# Patient Record
Sex: FEMALE | Race: OTHER | HISPANIC OR LATINO | Employment: FULL TIME | URBAN - METROPOLITAN AREA
[De-identification: names, ages, dates, MRNs, and addresses within clinical notes are randomized per-mention and may not be internally consistent; named-entity substitution may affect disease eponyms.]

---

## 2020-04-30 ENCOUNTER — ANESTHESIA (OUTPATIENT)
Dept: EMERGENCY MEDICINE | Facility: HOSPITAL | Age: 55
End: 2020-04-30

## 2020-04-30 ENCOUNTER — ANESTHESIA EVENT (OUTPATIENT)
Dept: EMERGENCY MEDICINE | Facility: HOSPITAL | Age: 55
End: 2020-04-30

## 2020-04-30 ENCOUNTER — HOSPITAL ENCOUNTER (INPATIENT)
Facility: HOSPITAL | Age: 55
LOS: 4 days | Discharge: HOME | DRG: 394 | End: 2020-05-05
Attending: EMERGENCY MEDICINE | Admitting: STUDENT IN AN ORGANIZED HEALTH CARE EDUCATION/TRAINING PROGRAM

## 2020-04-30 DIAGNOSIS — K43.6 VENTRAL HERNIA WITH OBSTRUCTION AND WITHOUT GANGRENE: Primary | ICD-10-CM

## 2020-04-30 DIAGNOSIS — K56.690 OTHER PARTIAL INTESTINAL OBSTRUCTION: ICD-10-CM

## 2020-04-30 DIAGNOSIS — K56.600 PARTIAL BOWEL OBSTRUCTION: ICD-10-CM

## 2020-04-30 PROBLEM — Z79.4 TYPE 2 DIABETES MELLITUS, WITH LONG-TERM CURRENT USE OF INSULIN: Status: ACTIVE | Noted: 2020-04-30

## 2020-04-30 PROBLEM — E11.9 TYPE 2 DIABETES MELLITUS, WITH LONG-TERM CURRENT USE OF INSULIN: Status: ACTIVE | Noted: 2020-04-30

## 2020-04-30 LAB
ALBUMIN SERPL BCP-MCNC: 4.1 G/DL (ref 3.5–5.2)
ALP SERPL-CCNC: 97 U/L (ref 55–135)
ALT SERPL W/O P-5'-P-CCNC: 34 U/L (ref 10–44)
ANION GAP SERPL CALC-SCNC: 14 MMOL/L (ref 8–16)
AST SERPL-CCNC: 29 U/L (ref 10–40)
BASOPHILS # BLD AUTO: 0.02 K/UL (ref 0–0.2)
BASOPHILS NFR BLD: 0.2 % (ref 0–1.9)
BILIRUB SERPL-MCNC: 0.6 MG/DL (ref 0.1–1)
BUN SERPL-MCNC: 14 MG/DL (ref 6–20)
CALCIUM SERPL-MCNC: 10.4 MG/DL (ref 8.7–10.5)
CHLORIDE SERPL-SCNC: 98 MMOL/L (ref 95–110)
CO2 SERPL-SCNC: 27 MMOL/L (ref 23–29)
CREAT SERPL-MCNC: 0.8 MG/DL (ref 0.5–1.4)
DIFFERENTIAL METHOD: ABNORMAL
EOSINOPHIL # BLD AUTO: 0 K/UL (ref 0–0.5)
EOSINOPHIL NFR BLD: 0.3 % (ref 0–8)
ERYTHROCYTE [DISTWIDTH] IN BLOOD BY AUTOMATED COUNT: 14.8 % (ref 11.5–14.5)
EST. GFR  (AFRICAN AMERICAN): >60 ML/MIN/1.73 M^2
EST. GFR  (NON AFRICAN AMERICAN): >60 ML/MIN/1.73 M^2
GLUCOSE SERPL-MCNC: 151 MG/DL (ref 70–110)
HCT VFR BLD AUTO: 44.5 % (ref 37–48.5)
HGB BLD-MCNC: 14.2 G/DL (ref 12–16)
IMM GRANULOCYTES # BLD AUTO: 0.04 K/UL (ref 0–0.04)
IMM GRANULOCYTES NFR BLD AUTO: 0.4 % (ref 0–0.5)
LACTATE SERPL-SCNC: 1.8 MMOL/L (ref 0.5–2.2)
LIPASE SERPL-CCNC: 21 U/L (ref 4–60)
LYMPHOCYTES # BLD AUTO: 1.4 K/UL (ref 1–4.8)
LYMPHOCYTES NFR BLD: 12.9 % (ref 18–48)
MCH RBC QN AUTO: 27.6 PG (ref 27–31)
MCHC RBC AUTO-ENTMCNC: 31.9 G/DL (ref 32–36)
MCV RBC AUTO: 87 FL (ref 82–98)
MONOCYTES # BLD AUTO: 0.6 K/UL (ref 0.3–1)
MONOCYTES NFR BLD: 5.2 % (ref 4–15)
NEUTROPHILS # BLD AUTO: 8.6 K/UL (ref 1.8–7.7)
NEUTROPHILS NFR BLD: 81 % (ref 38–73)
NRBC BLD-RTO: 0 /100 WBC
PLATELET # BLD AUTO: 308 K/UL (ref 150–350)
PMV BLD AUTO: 9.4 FL (ref 9.2–12.9)
POCT GLUCOSE: 109 MG/DL (ref 70–110)
POCT GLUCOSE: 122 MG/DL (ref 70–110)
POCT GLUCOSE: 93 MG/DL (ref 70–110)
POTASSIUM SERPL-SCNC: 4.2 MMOL/L (ref 3.5–5.1)
PROT SERPL-MCNC: 8.5 G/DL (ref 6–8.4)
RBC # BLD AUTO: 5.14 M/UL (ref 4–5.4)
SARS-COV-2 RDRP RESP QL NAA+PROBE: NEGATIVE
SODIUM SERPL-SCNC: 139 MMOL/L (ref 136–145)
WBC # BLD AUTO: 10.59 K/UL (ref 3.9–12.7)

## 2020-04-30 PROCEDURE — G0378 HOSPITAL OBSERVATION PER HR: HCPCS

## 2020-04-30 PROCEDURE — 27000221 HC OXYGEN, UP TO 24 HOURS

## 2020-04-30 PROCEDURE — 99285 EMERGENCY DEPT VISIT HI MDM: CPT | Mod: 25

## 2020-04-30 PROCEDURE — 63600175 PHARM REV CODE 636 W HCPCS: Performed by: EMERGENCY MEDICINE

## 2020-04-30 PROCEDURE — 94761 N-INVAS EAR/PLS OXIMETRY MLT: CPT

## 2020-04-30 PROCEDURE — 99222 1ST HOSP IP/OBS MODERATE 55: CPT | Mod: ,,, | Performed by: STUDENT IN AN ORGANIZED HEALTH CARE EDUCATION/TRAINING PROGRAM

## 2020-04-30 PROCEDURE — U0002 COVID-19 LAB TEST NON-CDC: HCPCS

## 2020-04-30 PROCEDURE — 63600175 PHARM REV CODE 636 W HCPCS: Performed by: STUDENT IN AN ORGANIZED HEALTH CARE EDUCATION/TRAINING PROGRAM

## 2020-04-30 PROCEDURE — 80053 COMPREHEN METABOLIC PANEL: CPT

## 2020-04-30 PROCEDURE — 96375 TX/PRO/DX INJ NEW DRUG ADDON: CPT | Performed by: EMERGENCY MEDICINE

## 2020-04-30 PROCEDURE — 96361 HYDRATE IV INFUSION ADD-ON: CPT | Performed by: EMERGENCY MEDICINE

## 2020-04-30 PROCEDURE — 99900035 HC TECH TIME PER 15 MIN (STAT)

## 2020-04-30 PROCEDURE — 83605 ASSAY OF LACTIC ACID: CPT

## 2020-04-30 PROCEDURE — 25000003 PHARM REV CODE 250: Performed by: STUDENT IN AN ORGANIZED HEALTH CARE EDUCATION/TRAINING PROGRAM

## 2020-04-30 PROCEDURE — 96375 TX/PRO/DX INJ NEW DRUG ADDON: CPT

## 2020-04-30 PROCEDURE — 83690 ASSAY OF LIPASE: CPT

## 2020-04-30 PROCEDURE — 96374 THER/PROPH/DIAG INJ IV PUSH: CPT | Mod: 59

## 2020-04-30 PROCEDURE — 25500020 PHARM REV CODE 255: Performed by: EMERGENCY MEDICINE

## 2020-04-30 PROCEDURE — 85025 COMPLETE CBC W/AUTO DIFF WBC: CPT

## 2020-04-30 PROCEDURE — 99222 PR INITIAL HOSPITAL CARE,LEVL II: ICD-10-PCS | Mod: ,,, | Performed by: STUDENT IN AN ORGANIZED HEALTH CARE EDUCATION/TRAINING PROGRAM

## 2020-04-30 RX ORDER — INSULIN ASPART 100 [IU]/ML
0-5 INJECTION, SOLUTION INTRAVENOUS; SUBCUTANEOUS EVERY 6 HOURS PRN
Status: DISCONTINUED | OUTPATIENT
Start: 2020-04-30 | End: 2020-05-05 | Stop reason: HOSPADM

## 2020-04-30 RX ORDER — ACETAMINOPHEN 325 MG/1
650 TABLET ORAL EVERY 6 HOURS PRN
Status: DISCONTINUED | OUTPATIENT
Start: 2020-04-30 | End: 2020-05-05 | Stop reason: HOSPADM

## 2020-04-30 RX ORDER — ONDANSETRON 2 MG/ML
4 INJECTION INTRAMUSCULAR; INTRAVENOUS
Status: COMPLETED | OUTPATIENT
Start: 2020-04-30 | End: 2020-04-30

## 2020-04-30 RX ORDER — MORPHINE SULFATE 4 MG/ML
4 INJECTION, SOLUTION INTRAMUSCULAR; INTRAVENOUS
Status: COMPLETED | OUTPATIENT
Start: 2020-04-30 | End: 2020-04-30

## 2020-04-30 RX ORDER — DEXTROSE 50 % IN WATER (D50W) INTRAVENOUS SYRINGE
12.5
Status: DISCONTINUED | OUTPATIENT
Start: 2020-04-30 | End: 2020-05-05 | Stop reason: HOSPADM

## 2020-04-30 RX ORDER — GLUCAGON 1 MG
1 KIT INJECTION
Status: DISCONTINUED | OUTPATIENT
Start: 2020-04-30 | End: 2020-05-05 | Stop reason: HOSPADM

## 2020-04-30 RX ORDER — GLUCAGON 1 MG
1 KIT INJECTION
Status: DISCONTINUED | OUTPATIENT
Start: 2020-04-30 | End: 2020-04-30 | Stop reason: SDUPTHER

## 2020-04-30 RX ORDER — HYDROCHLOROTHIAZIDE 25 MG/1
25 TABLET ORAL DAILY
Status: DISCONTINUED | OUTPATIENT
Start: 2020-04-30 | End: 2020-05-05 | Stop reason: HOSPADM

## 2020-04-30 RX ORDER — SODIUM CHLORIDE 0.9 % (FLUSH) 0.9 %
10 SYRINGE (ML) INJECTION
Status: DISCONTINUED | OUTPATIENT
Start: 2020-04-30 | End: 2020-05-05 | Stop reason: HOSPADM

## 2020-04-30 RX ORDER — SODIUM CHLORIDE, SODIUM LACTATE, POTASSIUM CHLORIDE, CALCIUM CHLORIDE 600; 310; 30; 20 MG/100ML; MG/100ML; MG/100ML; MG/100ML
INJECTION, SOLUTION INTRAVENOUS CONTINUOUS
Status: DISCONTINUED | OUTPATIENT
Start: 2020-04-30 | End: 2020-05-02

## 2020-04-30 RX ORDER — MORPHINE SULFATE 2 MG/ML
2 INJECTION, SOLUTION INTRAMUSCULAR; INTRAVENOUS EVERY 4 HOURS PRN
Status: DISCONTINUED | OUTPATIENT
Start: 2020-04-30 | End: 2020-05-05 | Stop reason: HOSPADM

## 2020-04-30 RX ORDER — ATORVASTATIN CALCIUM 40 MG/1
40 TABLET, FILM COATED ORAL DAILY
Status: DISCONTINUED | OUTPATIENT
Start: 2020-04-30 | End: 2020-05-05 | Stop reason: HOSPADM

## 2020-04-30 RX ADMIN — BENZOCAINE: 220 SPRAY, METERED PERIODONTAL at 06:04

## 2020-04-30 RX ADMIN — ACETAMINOPHEN 650 MG: 325 TABLET ORAL at 10:04

## 2020-04-30 RX ADMIN — LORAZEPAM 1 MG: 2 INJECTION INTRAMUSCULAR; INTRAVENOUS at 06:04

## 2020-04-30 RX ADMIN — TOPICAL ANESTHETIC: 200 SPRAY DENTAL; PERIODONTAL at 06:04

## 2020-04-30 RX ADMIN — HYDROCHLOROTHIAZIDE 25 MG: 25 TABLET ORAL at 03:04

## 2020-04-30 RX ADMIN — ATORVASTATIN CALCIUM 40 MG: 40 TABLET, FILM COATED ORAL at 03:04

## 2020-04-30 RX ADMIN — MORPHINE SULFATE 4 MG: 4 INJECTION INTRAVENOUS at 03:04

## 2020-04-30 RX ADMIN — IOHEXOL 100 ML: 350 INJECTION, SOLUTION INTRAVENOUS at 04:04

## 2020-04-30 RX ADMIN — SODIUM CHLORIDE, SODIUM LACTATE, POTASSIUM CHLORIDE, AND CALCIUM CHLORIDE: .6; .31; .03; .02 INJECTION, SOLUTION INTRAVENOUS at 12:04

## 2020-04-30 RX ADMIN — ONDANSETRON 4 MG: 2 INJECTION INTRAMUSCULAR; INTRAVENOUS at 03:04

## 2020-04-30 NOTE — ASSESSMENT & PLAN NOTE
Reina Edwards is a 54 y.o. female w/ pSBO from incarcerated ventral hernia    -Admit to obs  -NPO, NG to LIWS  -mIVF  -Send COVID test  -Will discuss surgey with staff and update note

## 2020-04-30 NOTE — ED NOTES
Report received form JUSTO Roa and JUSTO Barney. Assumed care of pt. Pt resting in recliner, in no acute distress. Pt aware of plan of care. Will continue to closely monitor.

## 2020-04-30 NOTE — ED TRIAGE NOTES
per Catracho Medina #[id number]. Pt states she has pain in the top part of  her abdomen that is related to hernia. Pain 8/10 at this time. Pt reports she vomited twice before coming to the ER. Pt states she was supposed to have surgery but it was cancelled because of the corona virus. Pt states she has been having vaginal spotting for over a year and she does not get periods. Will cont to monitor.

## 2020-04-30 NOTE — CONSULTS
[facility]  General Surgery  Consult Note    Patient Name: Reina Edwards  MRN: [id number]  Code Status: No Order  Admission Date: 4/30/2020  Hospital Length of Stay: 0 days  Attending Physician: No att. providers found  Primary Care Provider: No primary care provider on file.    Patient information was obtained from patient, past medical records and ER records.     Consults  Subjective:     Principal Problem: <principal problem not specified>    History of Present Illness: Reina Edwards is a 54 y.o. female w hx of morbid obesity (BMI 57), HTN, HLD, and recent hx of MI who presents with vomiting and abdominal pain.  States that she has had a midline hernia for about a year.  She says that she used to be able to reduce this, but recently can only partially reduce.  Of note, she says that she had an MI two months ago at OSH that was treated only with medication.  She is on ASA and Plavix (but says she hasn't taken plavix in over a week since she ran out).    PSHx- hysterectomy through [facility], [facility]  PMHx- as above        No current facility-administered medications on file prior to encounter.      Current Outpatient Medications on File Prior to Encounter   Medication Sig    atorvastatin (LIPITOR) 40 MG tablet Take 1 tablet (40 mg total) by mouth once daily.    hydrochlorothiazide (HYDRODIURIL) 25 MG tablet Take 1 tablet (25 mg total) by mouth once daily.    lisinopril (PRINIVIL,ZESTRIL) 40 MG tablet Take 1 tablet (40 mg total) by mouth once daily.    metformin (GLUCOPHAGE) 500 MG tablet Take 1 tablet (500 mg total) by mouth daily with breakfast.    nitroGLYCERIN (NITROSTAT) 0.4 MG SL tablet Place 1 tablet (0.4 mg total) under the tongue every 5 (five) minutes as needed for Chest pain (Take 1 after 15 minutes of chest pain, if chest pain persists, take a second pill and head to local Emergency Room).    sodium chloride (OCEAN) 0.65 % nasal spray 1 spray by Nasal route as needed for  Congestion.       Review of patient's allergies indicates:  No Known Allergies    Past Medical History:   Diagnosis Date    Hypertension      Past Surgical History:   Procedure Laterality Date     SECTION      CHOLECYSTECTOMY       Family History     None        Tobacco Use    Smoking status: Never Smoker    Smokeless tobacco: Never Used   Substance and Sexual Activity    Alcohol use: No    Drug use: No    Sexual activity: Not on file     Review of Systems   Constitutional: Positive for activity change and appetite change. Negative for chills, fatigue and fever.   HENT: Negative for sinus pressure and sinus pain.    Eyes: Negative for discharge and itching.   Respiratory: Negative for cough, choking and shortness of breath.    Cardiovascular: Negative for chest pain and leg swelling.   Gastrointestinal: Positive for abdominal distention, abdominal pain, nausea and vomiting. Negative for anal bleeding, blood in stool, constipation and diarrhea.   Endocrine: Negative for cold intolerance and heat intolerance.   Genitourinary: Negative for difficulty urinating and dysuria.   Musculoskeletal: Negative for arthralgias and back pain.   Skin: Negative for color change, pallor and rash.   Neurological: Negative for dizziness and facial asymmetry.   Psychiatric/Behavioral: Negative for agitation.     Objective:     Vital Signs (Most Recent):  Temp: 98.2 °F (36.8 °C) (20)  Pulse: 98 (20)  Resp: 18 (20)  BP: (!) 155/82 (20)  SpO2: 95 % (2042) Vital Signs (24h Range):  Temp:  [98.2 °F (36.8 °C)-98.5 °F (36.9 °C)] 98.2 °F (36.8 °C)  Pulse:  [75-98] 98  Resp:  [16-18] 18  SpO2:  [80 %-99 %] 95 %  BP: (155-182)/(80-94) 155/82     Weight: 133.4 kg (294 lb)  Body mass index is 57.42 kg/m².    Physical Exam   Constitutional: She is oriented to person, place, and time. She appears well-developed and well-nourished.   HENT:   Head: Normocephalic and atraumatic.    Eyes: Right eye exhibits no discharge. Left eye exhibits no discharge.   Neck: Normal range of motion. Neck supple.   Cardiovascular: Normal rate and regular rhythm.   Pulmonary/Chest: Effort normal. No respiratory distress.   Satting 97% on 6 L NC   Abdominal: Soft.   Somewhat distended.  Obese.  Can appreciate a midline hernia above umbilicus but can't define fascial edges.  Can not reduce.  No overlying skin changes.  Scars indicative of surgeries mentioned in HPI   Musculoskeletal: Normal range of motion.   Neurological: She is alert and oriented to person, place, and time.   Skin: Skin is warm and dry.   Psychiatric: She has a normal mood and affect. Her behavior is normal.   Vitals reviewed.      Significant Labs:  CBC:   Recent Labs   Lab 04/30/20 0313   WBC 10.59   RBC 5.14   HGB 14.2   HCT 44.5      MCV 87   MCH 27.6   MCHC 31.9*     CMP:   Recent Labs   Lab 04/30/20 0313   *   CALCIUM 10.4   ALBUMIN 4.1   PROT 8.5*      K 4.2   CO2 27   CL 98   BUN 14   CREATININE 0.8   ALKPHOS 97   ALT 34   AST 29   BILITOT 0.6       Significant Diagnostics:  I have reviewed all pertinent imaging results/findings within the past 24 hours.    Assessment/Plan:     Partial bowel obstruction  Reina Edwards is a 54 y.o. female w/ pSBO from incarcerated ventral hernia    -Admit to obs  -NPO, NG to LIWS  -mIVF  -Will decompress tonight and likely get a GG study tomorrow  -If she does not open up on her own, then likely will need a hernia repair  -Explained to patient that she would be high risk given cardiac hx and other comorbidities  -Will hold off on anticoagulation right now in case she needs surgery in the next 24 hours      VTE Risk Mitigation (From admission, onward)    None          Thank you for your consult. I will follow-up with patient. Please contact us if you have any additional questions.    Sloan Kelley MD  General Surgery  [facility]

## 2020-04-30 NOTE — ED PROVIDER NOTES
Encounter Date: 2020    SCRIBE #1 NOTE: I, Tenzin Jose, am scribing for, and in the presence of,  Dr. Marcano. I have scribed the entire note.     I, Dr. Bella Marcano MD, personally performed the services described in this documentation. All medical record entries made by the scribe were at my direction and in my presence.  I have reviewed the chart and agree that the record reflects my personal performance and is accurate and complete. Bella Marcano MD.    History     Chief Complaint   Patient presents with    Abdominal Pain     To ER with c/o pain to abd hernia and vomiting starting yesterday    Vomiting     CHIEF COMPLAINT: Patient presents with: abdominal pain     HISTORY OF PRESENT ILLNESS: Reina Edwards who is a 54 y.o. presents to the emergency department today with complaint of abdominal pain related to hernia with vomiting. The patient reports she has had a ventral hernia for the past year for which she has been evaluated at [facility], but reports she began having pain to the upper abdomen at the hernia site yesterday. She states the pain has been constant since onset has had vomiting since yesterday. She had 2 episodes of vomiting. No hematemesis, no coffee ground emesis.Pain is rated 8/10 in the ED, and notes her last bowel movement occurred 4 hours ago. Patient denies any fever, diarrhea, or change in urination. She has a history of DM, and states that she takes pills only.    ALLERGIES REVIEWED  MEDICATIONS REVIEWED  PMH/PSH/SOC/FH REVIEWED     The history is provided by the patient.    Nursing/Ancillary staff note reviewed.    The history is provided by the patient. The history is limited by a language barrier. A  was used.     Review of patient's allergies indicates:  No Known Allergies  Past Medical History:   Diagnosis Date    Hypertension      Past Surgical History:   Procedure Laterality Date     SECTION      CHOLECYSTECTOMY       No family history on file.  Social  History     Tobacco Use    Smoking status: Never Smoker    Smokeless tobacco: Never Used   Substance Use Topics    Alcohol use: No    Drug use: No     Review of Systems   Constitutional: Negative for activity change, appetite change, chills, diaphoresis and fever.   HENT: Negative for congestion, drooling, ear pain, mouth sores, rhinorrhea, sinus pain, sore throat and trouble swallowing.    Eyes: Negative for pain and discharge.   Respiratory: Negative for cough, chest tightness, shortness of breath, wheezing and stridor.    Cardiovascular: Negative for chest pain, palpitations and leg swelling.   Gastrointestinal: Positive for abdominal pain, nausea and vomiting. Negative for abdominal distention, blood in stool, constipation and diarrhea.   Genitourinary: Negative for difficulty urinating, dysuria, flank pain, frequency, hematuria and urgency.   Musculoskeletal: Negative for arthralgias, back pain and myalgias.   Skin: Negative for pallor, rash and wound.   Neurological: Negative for dizziness, syncope, weakness, light-headedness and numbness.   All other systems reviewed and are negative.      Physical Exam     Initial Vitals [04/30/20 0211]   BP Pulse Resp Temp SpO2   (!) 160/80 93 18 98.5 °F (36.9 °C) 95 %      MAP       --         Physical Exam    Nursing note and vitals reviewed.  Constitutional: She appears well-developed and well-nourished.   Obese   HENT:   Head: Normocephalic and atraumatic.   Right Ear: External ear normal.   Left Ear: External ear normal.   Nose: Nose normal.   Mouth/Throat: Oropharynx is clear and moist.   Eyes: Conjunctivae and EOM are normal. Pupils are equal, round, and reactive to light. No scleral icterus.   Neck: Normal range of motion. Neck supple. No JVD present.   Cardiovascular: Normal rate, regular rhythm, normal heart sounds and intact distal pulses. Exam reveals no gallop and no friction rub.    No murmur heard.  Pulmonary/Chest: Breath sounds normal. No stridor. No  respiratory distress. She has no wheezes. She exhibits no tenderness.   Abdominal: Soft. Bowel sounds are normal. She exhibits no distension and no mass. There is tenderness. There is no rebound and no guarding.   Slight tenderness to palpation to the epigastric region and above the umbilicus. I can palpate her ventral hernia.    Musculoskeletal: Normal range of motion. She exhibits no edema or tenderness.   Back is nontender to palpation.    Neurological: She is alert and oriented to person, place, and time. She has normal strength. No cranial nerve deficit.   Skin: Skin is warm and dry. Capillary refill takes less than 2 seconds. No rash noted. No pallor.   Psychiatric: She has a normal mood and affect. Thought content normal.         ED Course   Procedures  Labs Reviewed   CBC W/ AUTO DIFFERENTIAL - Abnormal; Notable for the following components:       Result Value    Mean Corpuscular Hemoglobin Conc 31.9 (*)     RDW 14.8 (*)     Gran # (ANC) 8.6 (*)     Gran% 81.0 (*)     Lymph% 12.9 (*)     All other components within normal limits   COMPREHENSIVE METABOLIC PANEL - Abnormal; Notable for the following components:    Glucose 151 (*)     Total Protein 8.5 (*)     All other components within normal limits   LIPASE          Imaging Results          CT Abdomen Pelvis With Contrast (Final result)  Result time 04/30/20 05:02:07    Final result by Lourdes Robb MD (04/30/20 05:02:07)                 Impression:      1. Prominent and mildly dilated loops of fluid-filled proximal and mid small bowel concerning for possible developing/partial small bowel obstruction with transition point involving a ventral abdominal wall hernia as detailed above.  2. Hypodensity involving endometrial canal possibly relating to endometrial fluid or hyperplasia.  Further evaluation with pelvic ultrasound advised.  Uterine fibroid.  3. Hepatomegaly and findings suggestive of hepatic steatosis.  Correlation with LFTs advised.  4. Fluid  within the distal esophagus which can be seen with dysmotility/reflux.  5. Slight basilar ground-glass opacity which can be seen with atelectasis possibly edema versus infectious or non infectious inflammatory process.  6. Cardiomegaly  7. Additional findings as discussed above.      Electronically signed by: Lourdes Robb MD  Date:    04/30/2020  Time:    05:02             Narrative:    EXAMINATION:  CT ABDOMEN PELVIS WITH CONTRAST    CLINICAL HISTORY:  Hernia, complicated;    TECHNIQUE:  Low dose axial images, sagittal and coronal reformations were obtained from the lung bases to the pubic symphysis following the IV administration of 100 mL of Omnipaque 350 .  Oral contrast was not given.    COMPARISON:  None.    FINDINGS:  The visualized lung bases demonstrate slight basilar ground-glass opacity which could relate to underlying atelectasis versus edema or non infectious or infectious inflammatory process.  Clinical correlation advised.  The heart appears prominent in size and there is calcification of the mitral annulus.    Please note examination is limited due to artifact from patient body habitus as well as patient motion artifact.  The liver is prominent in size and hypoattenuating which can be seen in the setting of hepatic steatosis.  Correlation with LFTs advised.  The gallbladder is surgically absent.  No significant intra or extrahepatic biliary ductal dilatation.  The spleen, pancreas, and adrenal glands are unremarkable.  There is fluid within the distal esophagus which can be seen in the setting of reflux/dysmotility.    The kidneys are normal in size and location enhance symmetrically.  No evidence of hydronephrosis.  There is a subcentimeter left renal hypodensity too small to definitively characterize.  The urinary bladder is within normal limits.  Uterus demonstrates a hypoattenuating 2.9 x 3.1 cm lesion in the uterine parenchyma likely a fibroid.  There is hypodensity within the endometrial canal.   Possible Bartholin's gland cyst present.  The adnexal regions are within normal limits.    There are prominent and mildly dilated loops of proximal and mid small bowel containing fluid and measuring up to 3.3 cm.  These prominent loops of bowel extend into a ventral abdominal wall hernia noting loops of small bowel in the hernia sac demonstrate and associated small bowel feces sign.  Decompressed loops of small bowel exit the hernia with decompressed distal loops of small bowel within the remaining abdomen.  Findings are concerning for possible developing small bowel obstruction.  No evidence of free intraperitoneal air or portal venous gas.  No ascites.  There is diverticulosis without evidence to suggest diverticulitis.    The abdominal aorta is nonaneurysmal with mild atherosclerosis. No bulky lymphadenopathy.  Visualized osseous structures demonstrate degenerative change.                                 Medical Decision Making:   History:   Old Medical Records: I decided to obtain old medical records.  Old Records Summarized: other records.       <> Summary of Records: Known h/o ventral hernia.   Initial Assessment:   54-year-old female presents to the emergency department today with complaint of abdominal pain from her ventral hernia.  Concerns are for an obstructing hernia/incarcerated hernia, small bowel obstruction.  Will obtain labs including CBC, CMP, lipase, obtain CT scan to further evaluate her hernia.  Treat her pain and reassess.  Differential Diagnosis:   Gastroenteritis, gastritis, ulcer, cholecystitis, gallstones, pancreatitis, ileus, small bowel obstruction, appendicitis, constipation.     Clinical Tests:   Lab Tests: Ordered and Reviewed  Radiological Study: Ordered and Reviewed  ED Management:  Reina Edwards presents to the ED with abdominal pain, she has signs of patial vs developing bowel obstruction on her CT scan. I spoke with Dr Barboza who agrees with admission. She'll be admitted to   [facility].                    ED Course as of Apr 30 0543   Thu Apr 30, 2020   0350 Normal labs :    [JA]   0350 WBC: 10.59 [JA]   0350 Hemoglobin: 14.2 [JA]   0350 Hematocrit: 44.5 [JA]   0350 BILIRUBIN TOTAL: 0.6 [JA]   0350 AST: 29 [JA]   0350 ALT: 34 [JA]   0350 Lipase: 21 [JA]   0513 Patient's pain has resolved; no further vomiting. CT scan shows possible developing bowel obstruction. Will call General Surgery for their recommendations.    [EW]   5522 Discussed with Dr. Barboza, General Surgery, who will admit the patient.    [EW]      ED Course User Index  [EW] Tenzin Jose  [JA] Bella Marcano MD                Clinical Impression:       ICD-10-CM ICD-9-CM   1. Ventral hernia with obstruction and without gangrene K43.6 552.20   2. Partial bowel obstruction K56.600 560.9         Disposition:   Disposition: Admitted  Condition: Fair     ED Disposition Condition    Observation                           Bella Marcano MD  04/30/20 0512

## 2020-04-30 NOTE — ANESTHESIA PREPROCEDURE EVALUATION
04/30/2020  Reina Edwards is a 54 y.o., female w pmhx of HTN/HLD, CAD (on ASA & plavix), DM2, morbid obesity (BMI 57) with abd pain with ventral hernia with signs of SBO    Pre-op Assessment    I have reviewed the Patient Summary Reports.     I have reviewed the Nursing Notes.      Review of Systems  Cardiovascular:   Hypertension CAD      Pulmonary:  Pulmonary Normal    Hepatic/GI:   Ventral hernia with SBO   Neurological:  Neurology Normal    Endocrine:   Diabetes        Physical Exam  General:  Morbid Obesity                 Anesthesia Plan  Type of Anesthesia, risks & benefits discussed:  Anesthesia Type:  general, regional  Patient's Preference:   Intra-op Monitoring Plan: standard ASA monitors  Intra-op Monitoring Plan Comments:   Post Op Pain Control Plan:   Post Op Pain Control Plan Comments:   Induction:    Beta Blocker:         Informed Consent:    ASA Score:      Day of Surgery Review of History & Physical:              Lab Results   Component Value Date    WBC 10.59 04/30/2020    HGB 14.2 04/30/2020    HCT 44.5 04/30/2020     04/30/2020    ALT 34 04/30/2020    AST 29 04/30/2020     04/30/2020    K 4.2 04/30/2020    CL 98 04/30/2020    CREATININE 0.8 04/30/2020    BUN 14 04/30/2020    CO2 27 04/30/2020    INR 1.0 02/26/2016

## 2020-04-30 NOTE — SUBJECTIVE & OBJECTIVE
No current facility-administered medications on file prior to encounter.      Current Outpatient Medications on File Prior to Encounter   Medication Sig    atorvastatin (LIPITOR) 40 MG tablet Take 1 tablet (40 mg total) by mouth once daily.    hydrochlorothiazide (HYDRODIURIL) 25 MG tablet Take 1 tablet (25 mg total) by mouth once daily.    lisinopril (PRINIVIL,ZESTRIL) 40 MG tablet Take 1 tablet (40 mg total) by mouth once daily.    metformin (GLUCOPHAGE) 500 MG tablet Take 1 tablet (500 mg total) by mouth daily with breakfast.    nitroGLYCERIN (NITROSTAT) 0.4 MG SL tablet Place 1 tablet (0.4 mg total) under the tongue every 5 (five) minutes as needed for Chest pain (Take 1 after 15 minutes of chest pain, if chest pain persists, take a second pill and head to local Emergency Room).    sodium chloride (OCEAN) 0.65 % nasal spray 1 spray by Nasal route as needed for Congestion.       Review of patient's allergies indicates:  No Known Allergies    Past Medical History:   Diagnosis Date    Hypertension      Past Surgical History:   Procedure Laterality Date     SECTION      CHOLECYSTECTOMY       Family History     None        Tobacco Use    Smoking status: Never Smoker    Smokeless tobacco: Never Used   Substance and Sexual Activity    Alcohol use: No    Drug use: No    Sexual activity: Not on file     Review of Systems   Constitutional: Positive for activity change and appetite change. Negative for chills, fatigue and fever.   HENT: Negative for sinus pressure and sinus pain.    Eyes: Negative for discharge and itching.   Respiratory: Negative for cough, choking and shortness of breath.    Cardiovascular: Negative for chest pain and leg swelling.   Gastrointestinal: Positive for abdominal distention, abdominal pain, nausea and vomiting. Negative for anal bleeding, blood in stool, constipation and diarrhea.   Endocrine: Negative for cold intolerance and heat intolerance.   Genitourinary: Negative  for difficulty urinating and dysuria.   Musculoskeletal: Negative for arthralgias and back pain.   Skin: Negative for color change, pallor and rash.   Neurological: Negative for dizziness and facial asymmetry.   Psychiatric/Behavioral: Negative for agitation.     Objective:     Vital Signs (Most Recent):  Temp: 98.2 °F (36.8 °C) (04/30/20 0727)  Pulse: 98 (04/30/20 0742)  Resp: 18 (04/30/20 0727)  BP: (!) 155/82 (04/30/20 0727)  SpO2: 95 % (04/30/20 0742) Vital Signs (24h Range):  Temp:  [98.2 °F (36.8 °C)-98.5 °F (36.9 °C)] 98.2 °F (36.8 °C)  Pulse:  [75-98] 98  Resp:  [16-18] 18  SpO2:  [80 %-99 %] 95 %  BP: (155-182)/(80-94) 155/82     Weight: 133.4 kg (294 lb)  Body mass index is 57.42 kg/m².    Physical Exam   Constitutional: She is oriented to person, place, and time. She appears well-developed and well-nourished.   HENT:   Head: Normocephalic and atraumatic.   Eyes: Right eye exhibits no discharge. Left eye exhibits no discharge.   Neck: Normal range of motion. Neck supple.   Cardiovascular: Normal rate and regular rhythm.   Pulmonary/Chest: Effort normal. No respiratory distress.   Satting 97% on 6 L NC   Abdominal: Soft.   Somewhat distended.  Obese.  Can appreciate a midline hernia above umbilicus but can't define fascial edges.  Can not reduce.  No overlying skin changes.  Scars indicative of surgeries mentioned in HPI   Musculoskeletal: Normal range of motion.   Neurological: She is alert and oriented to person, place, and time.   Skin: Skin is warm and dry.   Psychiatric: She has a normal mood and affect. Her behavior is normal.   Vitals reviewed.      Significant Labs:  CBC:   Recent Labs   Lab 04/30/20 0313   WBC 10.59   RBC 5.14   HGB 14.2   HCT 44.5      MCV 87   MCH 27.6   MCHC 31.9*     CMP:   Recent Labs   Lab 04/30/20 0313   *   CALCIUM 10.4   ALBUMIN 4.1   PROT 8.5*      K 4.2   CO2 27   CL 98   BUN 14   CREATININE 0.8   ALKPHOS 97   ALT 34   AST 29   BILITOT 0.6        Significant Diagnostics:  I have reviewed all pertinent imaging results/findings within the past 24 hours.

## 2020-04-30 NOTE — PLAN OF CARE
Patient alert and awake. With intact NGT draining to thick yellowish secretions. On oxygen at 3L/NC. MD with orders to give ice chips.On Meds given. Oriented to room set up. Call light within reach. Bed alarm on.

## 2020-04-30 NOTE — ED NOTES
Pt unable to tolerate NGT insertion. As soon as tube entered nare, patient began batting nurses hands away and yelling no. Pt unable to tolerate and appears very agitated. Dr. Hartman notified and arrived to bedside and encouraged pt to allow nurses to retry after receiving IV Antivan.  Cynthia [phone] from [employer] remained on Ipad monitor for entire conversation.

## 2020-04-30 NOTE — HPI
Reina Edwards is a 54 y.o. female w hx of morbid obesity (BMI 57), HTN, HLD, and recent hx of MI who presents with vomiting and abdominal pain.  States that she has had a midline hernia for about a year.  She says that she used to be able to reduce this, but recently can only partially reduce.  Of note, she says that she had an MI two months ago at OSH that was treated only with medication.  She is on ASA and Plavix (but says she hasn't taken plavix in over a week since she ran out).    PSHx- hysterectomy through [facility], lap [facility]  PMHx- as above

## 2020-04-30 NOTE — PLAN OF CARE
BEBE note:   Pt's chart, labs and vital signs reviewed.  Pt Albanian speaking only, informed bedside nurse BEBE Parks unable to complete admission due to language barrier.  Will be available to intervene if needed.

## 2020-04-30 NOTE — ED NOTES
Gastric pH=7 after NGT placement. Dr. Hartman notified. Awaiting cxray to confirm placement and determine if tube adjustment is required.

## 2020-05-01 LAB
POCT GLUCOSE: 101 MG/DL (ref 70–110)
POCT GLUCOSE: 104 MG/DL (ref 70–110)
POCT GLUCOSE: 109 MG/DL (ref 70–110)
POCT GLUCOSE: 126 MG/DL (ref 70–110)
POCT GLUCOSE: 131 MG/DL (ref 70–110)

## 2020-05-01 PROCEDURE — 11000001 HC ACUTE MED/SURG PRIVATE ROOM

## 2020-05-01 PROCEDURE — 27000221 HC OXYGEN, UP TO 24 HOURS

## 2020-05-01 PROCEDURE — 25000003 PHARM REV CODE 250: Performed by: STUDENT IN AN ORGANIZED HEALTH CARE EDUCATION/TRAINING PROGRAM

## 2020-05-01 PROCEDURE — 94761 N-INVAS EAR/PLS OXIMETRY MLT: CPT

## 2020-05-01 PROCEDURE — 96376 TX/PRO/DX INJ SAME DRUG ADON: CPT | Performed by: EMERGENCY MEDICINE

## 2020-05-01 PROCEDURE — 63600175 PHARM REV CODE 636 W HCPCS: Performed by: STUDENT IN AN ORGANIZED HEALTH CARE EDUCATION/TRAINING PROGRAM

## 2020-05-01 PROCEDURE — 96361 HYDRATE IV INFUSION ADD-ON: CPT | Performed by: EMERGENCY MEDICINE

## 2020-05-01 RX ORDER — ONDANSETRON 2 MG/ML
8 INJECTION INTRAMUSCULAR; INTRAVENOUS EVERY 8 HOURS PRN
Status: DISCONTINUED | OUTPATIENT
Start: 2020-05-01 | End: 2020-05-05 | Stop reason: HOSPADM

## 2020-05-01 RX ADMIN — MORPHINE SULFATE 2 MG: 2 INJECTION, SOLUTION INTRAMUSCULAR; INTRAVENOUS at 07:05

## 2020-05-01 RX ADMIN — HYDROCHLOROTHIAZIDE 25 MG: 25 TABLET ORAL at 09:05

## 2020-05-01 RX ADMIN — ACETAMINOPHEN 650 MG: 325 TABLET ORAL at 05:05

## 2020-05-01 RX ADMIN — ACETAMINOPHEN 650 MG: 325 TABLET ORAL at 10:05

## 2020-05-01 RX ADMIN — SODIUM CHLORIDE, SODIUM LACTATE, POTASSIUM CHLORIDE, AND CALCIUM CHLORIDE: .6; .31; .03; .02 INJECTION, SOLUTION INTRAVENOUS at 09:05

## 2020-05-01 RX ADMIN — ATORVASTATIN CALCIUM 40 MG: 40 TABLET, FILM COATED ORAL at 09:05

## 2020-05-01 NOTE — PLAN OF CARE
TN met with patient at bedside to complete discharge assessment. Language line also used to complete assessment. Currently, patient lives at home with her daughter Carol [phone]. No DME or HH noted. Upon discharge, patient states that her daughter will provide transportation home and be available to help as needed. Patient states that she goes to [facility] for her PCP.      TN case manger updated whiteboard with contact information. TN instructed patient to contact TN if she has any further questions or concerns. TN will follow throughout transitions of care and will assist with any discharge needs.          05/01/20 1404   Discharge Assessment   Assessment Type Discharge Planning Assessment   Confirmed/corrected address and phone number on facesheet? Yes   Assessment information obtained from? Patient;Medical Record   Expected Length of Stay (days) 2   Communicated expected length of stay with patient/caregiver yes   Prior to hospitilization cognitive status: Alert/Oriented   Prior to hospitalization functional status: Independent   Current cognitive status: Alert/Oriented   Current Functional Status: Needs Assistance   Lives With child(minal), adult  (Carol [phone])   Able to Return to Prior Arrangements yes   Is patient able to care for self after discharge? Yes   Patient's perception of discharge disposition home or selfcare   Readmission Within the Last 30 Days no previous admission in last 30 days   Patient currently being followed by outpatient case management? No   Patient currently receives any other outside agency services? No   Equipment Currently Used at Home none   Do you have any problems affording any of your prescribed medications? No   Is the patient taking medications as prescribed? yes   Does the patient have transportation home? Yes   Transportation Anticipated family or friend will provide   Does the patient receive services at the [facility]? No   Discharge Plan A Home with  family   Discharge Plan B Home with family;Home Health   DME Needed Upon Discharge  none   Patient/Family in Agreement with Plan yes

## 2020-05-01 NOTE — PROGRESS NOTES
[facility]  General Surgery  Progress Note    Subjective:     History of Present Illness:  Reina Edwards is a 54 y.o. female w hx of morbid obesity (BMI 57), HTN, HLD, and recent hx of MI who presents with vomiting and abdominal pain.  States that she has had a midline hernia for about a year.  She says that she used to be able to reduce this, but recently can only partially reduce.  Of note, she says that she had an MI two months ago at [facility] that was treated only with medication.  She is on ASA and Plavix (but says she hasn't taken plavix in over a week since she ran out).    PSHx- hysterectomy through [facility]fannenstiel, lap milton  PMHx- as above        Post-Op Info:  Procedure(s) (LRB):  ROBOTIC REPAIR, HERNIA, VENTRAL (N/A)   1 Day Post-Op     Interval History: Did well overnight.  No nausea/vomiting this AM.  On less O2.  Says pain has improved, but just had some morphine.    Medications:  Continuous Infusions:   lactated ringers 125 mL/hr at 05/01/20 0952     Scheduled Meds:   atorvastatin  40 mg Oral Daily    hydroCHLOROthiazide  25 mg Oral Daily     PRN Meds:acetaminophen, dextrose 50 % in water (D50W), glucagon (human recombinant), insulin aspart U-100, morphine, sodium chloride 0.9%     Review of patient's allergies indicates:  No Known Allergies  Objective:     Vital Signs (Most Recent):  Temp: 98.5 °F (36.9 °C) (05/01/20 0800)  Pulse: 76 (05/01/20 0800)  Resp: 20 (05/01/20 0800)  BP: 131/66 (05/01/20 0800)  SpO2: (!) 94 % (05/01/20 0834) Vital Signs (24h Range):  Temp:  [98.1 °F (36.7 °C)-98.6 °F (37 °C)] 98.5 °F (36.9 °C)  Pulse:  [76-87] 76  Resp:  [17-20] 20  SpO2:  [92 %-97 %] 94 %  BP: (130-160)/(60-89) 131/66     Weight: 131.4 kg (289 lb 11 oz)  Body mass index is 56.58 kg/m².    Intake/Output - Last 3 Shifts       04/29 0700 - 04/30 0659 04/30 0700 - 05/01 0659 05/01 0700 - 05/02 0659    I.V. (mL/kg)  702.1 (5.3)     NG/GT  100     Total Intake(mL/kg)  802.1 (6.1)     Urine  (mL/kg/hr)   900 (1.9)    Drains  400     Total Output  400 900    Net  +402.1 -900                 Physical Exam   Constitutional: She is oriented to person, place, and time. She appears well-developed and well-nourished.   HENT:   Head: Normocephalic and atraumatic.   Eyes: Right eye exhibits no discharge. Left eye exhibits no discharge.   Neck: Normal range of motion. Neck supple.   Cardiovascular: Normal rate and regular rhythm.   Pulmonary/Chest: Effort normal. No respiratory distress.   Satting 97% on 2 L NC   Abdominal: Soft.   Somewhat distended.  Obese.  Can appreciate a midline hernia above umbilicus but can't define fascial edges.  Can not reduce.  No overlying skin changes.  Scars indicative of surgeries mentioned in HPI   Musculoskeletal: Normal range of motion.   Neurological: She is alert and oriented to person, place, and time.   Skin: Skin is warm and dry.   Psychiatric: She has a normal mood and affect. Her behavior is normal.   Vitals reviewed.      Significant Labs:  CBC:   Recent Labs   Lab 04/30/20  0313   WBC 10.59   RBC 5.14   HGB 14.2   HCT 44.5      MCV 87   MCH 27.6   MCHC 31.9*     CMP:   Recent Labs   Lab 04/30/20  0313   *   CALCIUM 10.4   ALBUMIN 4.1   PROT 8.5*      K 4.2   CO2 27   CL 98   BUN 14   CREATININE 0.8   ALKPHOS 97   ALT 34   AST 29   BILITOT 0.6       Significant Diagnostics:  I have reviewed all pertinent imaging results/findings within the past 24 hours.    Assessment/Plan:     Partial bowel obstruction  Reina Edwards is a 54 y.o. female w/ pSBO from incarcerated ventral hernia    -Did well overnight, will try clears today  -Home meds, holding Plavix in case we need to operate  -If she tolerates clears will advance, if not, will need surgery  -Will keep mIVF until tolerating clears        Sloan Kelley MD  General Surgery  [facility]

## 2020-05-01 NOTE — SUBJECTIVE & OBJECTIVE
Interval History: Did well overnight.  No nausea/vomiting this AM.  On less O2.  Says pain has improved, but just had some morphine.    Medications:  Continuous Infusions:   lactated ringers 125 mL/hr at 05/01/20 0952     Scheduled Meds:   atorvastatin  40 mg Oral Daily    hydroCHLOROthiazide  25 mg Oral Daily     PRN Meds:acetaminophen, dextrose 50 % in water (D50W), glucagon (human recombinant), insulin aspart U-100, morphine, sodium chloride 0.9%     Review of patient's allergies indicates:  No Known Allergies  Objective:     Vital Signs (Most Recent):  Temp: 98.5 °F (36.9 °C) (05/01/20 0800)  Pulse: 76 (05/01/20 0800)  Resp: 20 (05/01/20 0800)  BP: 131/66 (05/01/20 0800)  SpO2: (!) 94 % (05/01/20 0834) Vital Signs (24h Range):  Temp:  [98.1 °F (36.7 °C)-98.6 °F (37 °C)] 98.5 °F (36.9 °C)  Pulse:  [76-87] 76  Resp:  [17-20] 20  SpO2:  [92 %-97 %] 94 %  BP: (130-160)/(60-89) 131/66     Weight: 131.4 kg (289 lb 11 oz)  Body mass index is 56.58 kg/m².    Intake/Output - Last 3 Shifts       04/29 0700 - 04/30 0659 04/30 0700 - 05/01 0659 05/01 0700 - 05/02 0659    I.V. (mL/kg)  702.1 (5.3)     NG/GT  100     Total Intake(mL/kg)  802.1 (6.1)     Urine (mL/kg/hr)   900 (1.9)    Drains  400     Total Output  400 900    Net  +402.1 -900                 Physical Exam   Constitutional: She is oriented to person, place, and time. She appears well-developed and well-nourished.   HENT:   Head: Normocephalic and atraumatic.   Eyes: Right eye exhibits no discharge. Left eye exhibits no discharge.   Neck: Normal range of motion. Neck supple.   Cardiovascular: Normal rate and regular rhythm.   Pulmonary/Chest: Effort normal. No respiratory distress.   Satting 97% on 2 L NC   Abdominal: Soft.   Somewhat distended.  Obese.  Can appreciate a midline hernia above umbilicus but can't define fascial edges.  Can not reduce.  No overlying skin changes.  Scars indicative of surgeries mentioned in HPI   Musculoskeletal: Normal range of  motion.   Neurological: She is alert and oriented to person, place, and time.   Skin: Skin is warm and dry.   Psychiatric: She has a normal mood and affect. Her behavior is normal.   Vitals reviewed.      Significant Labs:  CBC:   Recent Labs   Lab 04/30/20 0313   WBC 10.59   RBC 5.14   HGB 14.2   HCT 44.5      MCV 87   MCH 27.6   MCHC 31.9*     CMP:   Recent Labs   Lab 04/30/20 0313   *   CALCIUM 10.4   ALBUMIN 4.1   PROT 8.5*      K 4.2   CO2 27   CL 98   BUN 14   CREATININE 0.8   ALKPHOS 97   ALT 34   AST 29   BILITOT 0.6       Significant Diagnostics:  I have reviewed all pertinent imaging results/findings within the past 24 hours.

## 2020-05-01 NOTE — PLAN OF CARE
VIRTUAL NURSE:  Cued into patient's room.  Permission received per patient to turn camera to view patient. Pt resting  In bed at this time. She is German speaking. NGT in place.  Introduced as VN for night shift that will be working with floor nurse and nursing assistant. No distress noted.  Instructed to call for assistance.  Will cont to monitor.    Labs, notes, and orders reviewed.

## 2020-05-01 NOTE — PLAN OF CARE
Pt vitals were maintained, pt NGT put out this thick yellow/ greenish drainage. Pt denied any pain. Pt ambulates well to bathroom. Pt does become SOB when ambulating. Pt is predominately Belarusian speaking. Pt wants to know when she going home. Pt NGT tube put out. Pt bg level was maintained.

## 2020-05-01 NOTE — ASSESSMENT & PLAN NOTE
Reina Edwards is a 54 y.o. female w/ pSBO from incarcerated ventral hernia    -Did well overnight, will try clears today  -Home meds, holding Plavix in case we need to operate  -If she tolerates clears will advance, if not, will need surgery  -Will keep mIVF until tolerating clears

## 2020-05-01 NOTE — PLAN OF CARE
Patient alert and awake. Vomit x1 during the shift, greenish in color about 1/2 cup. MD informed. Ordered Zofran PRN. NGT clamped. On clear liquid diet. Will continue to monitor.

## 2020-05-02 LAB
POCT GLUCOSE: 100 MG/DL (ref 70–110)
POCT GLUCOSE: 101 MG/DL (ref 70–110)
POCT GLUCOSE: 86 MG/DL (ref 70–110)
POCT GLUCOSE: 90 MG/DL (ref 70–110)

## 2020-05-02 PROCEDURE — 63600175 PHARM REV CODE 636 W HCPCS: Performed by: STUDENT IN AN ORGANIZED HEALTH CARE EDUCATION/TRAINING PROGRAM

## 2020-05-02 PROCEDURE — 25000003 PHARM REV CODE 250: Performed by: STUDENT IN AN ORGANIZED HEALTH CARE EDUCATION/TRAINING PROGRAM

## 2020-05-02 PROCEDURE — 99232 PR SUBSEQUENT HOSPITAL CARE,LEVL II: ICD-10-PCS | Mod: ,,, | Performed by: STUDENT IN AN ORGANIZED HEALTH CARE EDUCATION/TRAINING PROGRAM

## 2020-05-02 PROCEDURE — 11000001 HC ACUTE MED/SURG PRIVATE ROOM

## 2020-05-02 PROCEDURE — 27000221 HC OXYGEN, UP TO 24 HOURS

## 2020-05-02 PROCEDURE — 99232 SBSQ HOSP IP/OBS MODERATE 35: CPT | Mod: ,,, | Performed by: STUDENT IN AN ORGANIZED HEALTH CARE EDUCATION/TRAINING PROGRAM

## 2020-05-02 PROCEDURE — 94761 N-INVAS EAR/PLS OXIMETRY MLT: CPT

## 2020-05-02 PROCEDURE — 99900035 HC TECH TIME PER 15 MIN (STAT)

## 2020-05-02 RX ORDER — BUTALBITAL, ACETAMINOPHEN AND CAFFEINE 50; 325; 40 MG/1; MG/1; MG/1
1 TABLET ORAL EVERY 6 HOURS PRN
Status: DISCONTINUED | OUTPATIENT
Start: 2020-05-02 | End: 2020-05-05 | Stop reason: HOSPADM

## 2020-05-02 RX ORDER — HYDRALAZINE HYDROCHLORIDE 20 MG/ML
10 INJECTION INTRAMUSCULAR; INTRAVENOUS EVERY 6 HOURS PRN
Status: DISCONTINUED | OUTPATIENT
Start: 2020-05-02 | End: 2020-05-05 | Stop reason: HOSPADM

## 2020-05-02 RX ADMIN — ACETAMINOPHEN 650 MG: 325 TABLET ORAL at 06:05

## 2020-05-02 RX ADMIN — HYDROCHLOROTHIAZIDE 25 MG: 25 TABLET ORAL at 08:05

## 2020-05-02 RX ADMIN — ATORVASTATIN CALCIUM 40 MG: 40 TABLET, FILM COATED ORAL at 08:05

## 2020-05-02 RX ADMIN — SODIUM CHLORIDE, SODIUM LACTATE, POTASSIUM CHLORIDE, AND CALCIUM CHLORIDE: .6; .31; .03; .02 INJECTION, SOLUTION INTRAVENOUS at 06:05

## 2020-05-02 RX ADMIN — HYDRALAZINE HYDROCHLORIDE 10 MG: 20 INJECTION INTRAMUSCULAR; INTRAVENOUS at 11:05

## 2020-05-02 RX ADMIN — HYDRALAZINE HYDROCHLORIDE 10 MG: 20 INJECTION INTRAMUSCULAR; INTRAVENOUS at 05:05

## 2020-05-02 NOTE — PLAN OF CARE
Patient found without O2 satting low 80s. NC returned at 2 lpm with sats improving to 93%. No resp distress noted. Patient instructed to keep NC on. Will continue to monitor.

## 2020-05-02 NOTE — PROGRESS NOTES
[facility]  General Surgery  Progress Note    Subjective:     Interval History:   Feeling well, sitting up in chair  Denies pain  Denies nausea overnight or this morning  NG returned to suction with minimal output  +flatus  Tolerated clears    Post-Op Info:  Procedure(s) (LRB):  ROBOTIC REPAIR, HERNIA, VENTRAL (N/A)   2 Days Post-Op      Medications:  Continuous Infusions:   lactated ringers 125 mL/hr at 05/02/20 0630     Scheduled Meds:   atorvastatin  40 mg Oral Daily    hydroCHLOROthiazide  25 mg Oral Daily     PRN Meds:acetaminophen, dextrose 50 % in water (D50W), glucagon (human recombinant), insulin aspart U-100, morphine, ondansetron, sodium chloride 0.9%     Objective:     Vital Signs (Most Recent):  Temp: 98.6 °F (37 °C) (05/02/20 0835)  Pulse: 74 (05/02/20 0835)  Resp: 18 (05/02/20 0835)  BP: (!) 178/86 (05/02/20 0835)  SpO2: (!) 94 % (05/02/20 0835) Vital Signs (24h Range):  Temp:  [98.2 °F (36.8 °C)-98.8 °F (37.1 °C)] 98.6 °F (37 °C)  Pulse:  [65-75] 74  Resp:  [17-20] 18  SpO2:  [77 %-95 %] 94 %  BP: (129-178)/(65-86) 178/86       Intake/Output Summary (Last 24 hours) at 5/2/2020 1108  Last data filed at 5/2/2020 1000  Gross per 24 hour   Intake 3580 ml   Output 2950 ml   Net 630 ml       Physical Exam   Constitutional: She is oriented to person, place, and time. She appears well-nourished. No distress.   Cardiovascular: Normal rate.   Abdominal: Soft. There is no tenderness. A hernia is present.   Hernia palpable, not reducible but nontender   Neurological: She is alert and oriented to person, place, and time.   Skin: Skin is warm. No erythema.       Significant Labs:  CBC: No results for input(s): WBC, RBC, HGB, HCT, PLT, MCV, MCH, MCHC in the last 48 hours.  CMP: No results for input(s): GLU, CALCIUM, ALBUMIN, PROT, NA, K, CO2, CL, BUN, CREATININE, ALKPHOS, ALT, AST, BILITOT in the last 48 hours.    Significant Diagnostics:  I have reviewed all pertinent imaging results/findings  within the past 24 hours.    Assessment/Plan:     Active Diagnoses:    Diagnosis Date Noted POA    PRINCIPAL PROBLEM:  Ventral hernia with obstruction and without gangrene [K43.6]  Yes    Partial bowel obstruction [K56.600] 04/30/2020 Yes    Type 2 diabetes mellitus, with long-term current use of insulin [E11.9, Z79.4] 04/30/2020 Not Applicable    Hyperlipidemia [E78.5] 08/18/2016 Yes    Morbid obesity with BMI of 50.0-59.9, adult [E66.01, Z68.43] 03/16/2016 Not Applicable    Essential hypertension [I10] 03/16/2016 Yes      Problems Resolved During this Admission:     54F with incarcerated ventral hernia with partial SBO, resolving  NG tube clamped overnight - will remove NG and see how she does  Discussed with daughter. Pt had chest pain, left arm pain two months ago and went to and was diagnosed with MI. No stent. No complaints since then  Will continue clears without NG. Advance diet as tolerated. Prefer to avoid surgery for now. If unable to tolerate diet will plan for hernia repair  Htn - complains of headache. Will add hydralazine. Removing NG tube may help also      Brady Barboza MD  General Surgery  [facility]

## 2020-05-02 NOTE — PLAN OF CARE
VN Rounds. VN called into patient's room for rounding and turned camera with permission. Patient in the recliner. Polish speaking only.   VN instructed to call for assistance. Call light within reach. Patient verbalized understanding. No acute distress noted. Wearing O2 2 lt NC. Patient complain of mild pain after eating. Recommended to slow down on her diet.   Allowed time for questions. Will continue to monitor chart and be available and intervene as needed.

## 2020-05-02 NOTE — NURSING
MD Barboza notified of patient having complaints of nausea and NGT leaking since clamped; advised to hook patient back up to suction.

## 2020-05-02 NOTE — NURSING
Patient with complaints of HA. She informed me that she drinks caffeine freq. Notified MD Barboza, recommended ordering Fioricet to help with patients continued headaches.

## 2020-05-03 LAB
POCT GLUCOSE: 100 MG/DL (ref 70–110)
POCT GLUCOSE: 109 MG/DL (ref 70–110)
POCT GLUCOSE: 129 MG/DL (ref 70–110)
POCT GLUCOSE: 90 MG/DL (ref 70–110)

## 2020-05-03 PROCEDURE — 11000001 HC ACUTE MED/SURG PRIVATE ROOM

## 2020-05-03 PROCEDURE — 27000221 HC OXYGEN, UP TO 24 HOURS

## 2020-05-03 PROCEDURE — 94761 N-INVAS EAR/PLS OXIMETRY MLT: CPT

## 2020-05-03 PROCEDURE — 99232 SBSQ HOSP IP/OBS MODERATE 35: CPT | Mod: ,,, | Performed by: STUDENT IN AN ORGANIZED HEALTH CARE EDUCATION/TRAINING PROGRAM

## 2020-05-03 PROCEDURE — 63600175 PHARM REV CODE 636 W HCPCS: Performed by: STUDENT IN AN ORGANIZED HEALTH CARE EDUCATION/TRAINING PROGRAM

## 2020-05-03 PROCEDURE — 99232 PR SUBSEQUENT HOSPITAL CARE,LEVL II: ICD-10-PCS | Mod: ,,, | Performed by: STUDENT IN AN ORGANIZED HEALTH CARE EDUCATION/TRAINING PROGRAM

## 2020-05-03 PROCEDURE — 25000003 PHARM REV CODE 250: Performed by: STUDENT IN AN ORGANIZED HEALTH CARE EDUCATION/TRAINING PROGRAM

## 2020-05-03 RX ORDER — AMLODIPINE BESYLATE 5 MG/1
5 TABLET ORAL DAILY
Status: DISCONTINUED | OUTPATIENT
Start: 2020-05-03 | End: 2020-05-05 | Stop reason: HOSPADM

## 2020-05-03 RX ADMIN — BUTALBITAL, ACETAMINOPHEN, AND CAFFEINE 1 TABLET: 50; 325; 40 TABLET ORAL at 09:05

## 2020-05-03 RX ADMIN — HYDRALAZINE HYDROCHLORIDE 10 MG: 20 INJECTION INTRAMUSCULAR; INTRAVENOUS at 05:05

## 2020-05-03 RX ADMIN — ACETAMINOPHEN 650 MG: 325 TABLET ORAL at 12:05

## 2020-05-03 RX ADMIN — HYDRALAZINE HYDROCHLORIDE 10 MG: 20 INJECTION INTRAMUSCULAR; INTRAVENOUS at 09:05

## 2020-05-03 RX ADMIN — HYDROCHLOROTHIAZIDE 25 MG: 25 TABLET ORAL at 08:05

## 2020-05-03 RX ADMIN — HYDRALAZINE HYDROCHLORIDE 10 MG: 20 INJECTION INTRAMUSCULAR; INTRAVENOUS at 08:05

## 2020-05-03 RX ADMIN — ATORVASTATIN CALCIUM 40 MG: 40 TABLET, FILM COATED ORAL at 08:05

## 2020-05-03 RX ADMIN — AMLODIPINE BESYLATE 5 MG: 5 TABLET ORAL at 12:05

## 2020-05-03 NOTE — PROGRESS NOTES
[facility]  General Surgery  Progress Note    Subjective:     Interval History:   Feeling well, sitting up in chair  Denies pain  Denies nausea overnight or this morning  +BM  +flatus  Tolerated clears    Post-Op Info:  Procedure(s) (LRB):  ROBOTIC REPAIR, HERNIA, VENTRAL (N/A)   3 Days Post-Op      Medications:  Continuous Infusions:    Scheduled Meds:   amLODIPine  5 mg Oral Daily    atorvastatin  40 mg Oral Daily    hydroCHLOROthiazide  25 mg Oral Daily     PRN Meds:acetaminophen, butalbital-acetaminophen-caffeine -40 mg, dextrose 50 % in water (D50W), glucagon (human recombinant), hydrALAZINE, insulin aspart U-100, morphine, ondansetron, sodium chloride 0.9%     Objective:     Vital Signs (Most Recent):  Temp: 98.2 °F (36.8 °C) (05/03/20 0737)  Pulse: 78 (05/03/20 0737)  Resp: 18 (05/03/20 0737)  BP: (!) 175/91 (05/03/20 0737)  SpO2: 95 % (05/03/20 0737) Vital Signs (24h Range):  Temp:  [97.9 °F (36.6 °C)-98.8 °F (37.1 °C)] 98.2 °F (36.8 °C)  Pulse:  [65-91] 78  Resp:  [18-20] 18  SpO2:  [89 %-95 %] 95 %  BP: (133-178)/(75-93) 175/91       Intake/Output Summary (Last 24 hours) at 5/3/2020 1129  Last data filed at 5/3/2020 0800  Gross per 24 hour   Intake 2540 ml   Output 2500 ml   Net 40 ml       Physical Exam   Constitutional: She is oriented to person, place, and time. She appears well-nourished. No distress.   Cardiovascular: Normal rate.   Abdominal: Soft. There is no tenderness. A hernia is present.   Hernia palpable, not reducible but nontender   Neurological: She is alert and oriented to person, place, and time.   Skin: Skin is warm. No erythema.       Significant Labs:  CBC: No results for input(s): WBC, RBC, HGB, HCT, PLT, MCV, MCH, MCHC in the last 48 hours.  CMP: No results for input(s): GLU, CALCIUM, ALBUMIN, PROT, NA, K, CO2, CL, BUN, CREATININE, ALKPHOS, ALT, AST, BILITOT in the last 48 hours.    Significant Diagnostics:  I have reviewed all pertinent imaging  results/findings within the past 24 hours.    Assessment/Plan:     Active Diagnoses:    Diagnosis Date Noted POA    PRINCIPAL PROBLEM:  Ventral hernia with obstruction and without gangrene [K43.6]  Yes    Partial bowel obstruction [K56.600] 04/30/2020 Yes    Type 2 diabetes mellitus, with long-term current use of insulin [E11.9, Z79.4] 04/30/2020 Not Applicable    Hyperlipidemia [E78.5] 08/18/2016 Yes    Morbid obesity with BMI of 50.0-59.9, adult [E66.01, Z68.43] 03/16/2016 Not Applicable    Essential hypertension [I10] 03/16/2016 Yes      Problems Resolved During this Admission:     54F with incarcerated ventral hernia with partial SBO, resolving  No complaints now. Will advance diet  Htn - complains of headache. Fioricet, coffee. Added norvasc  If tolerates diet for lunch and dinner while likely discharge home. If not will get cards consult, plan for robot VHR      Brady Barboza MD  General Surgery  [facility]

## 2020-05-03 NOTE — PLAN OF CARE
Pt up in chair through out shift. Elevated BP covered with PRN hydralazine. NGT self-removed, on 2L NC, up to bathroom with assistance. Pt is Belarusian speaking. Language line used to communicate. No c/o sob, n/v, pain. No s/o distress noted.

## 2020-05-04 ENCOUNTER — TELEPHONE (OUTPATIENT)
Dept: SURGERY | Facility: CLINIC | Age: 55
End: 2020-05-04

## 2020-05-04 VITALS
WEIGHT: 282.44 LBS | DIASTOLIC BLOOD PRESSURE: 81 MMHG | HEIGHT: 60 IN | HEART RATE: 93 BPM | TEMPERATURE: 99 F | BODY MASS INDEX: 55.45 KG/M2 | RESPIRATION RATE: 20 BRPM | SYSTOLIC BLOOD PRESSURE: 140 MMHG | OXYGEN SATURATION: 93 %

## 2020-05-04 LAB
POCT GLUCOSE: 102 MG/DL (ref 70–110)
POCT GLUCOSE: 93 MG/DL (ref 70–110)

## 2020-05-04 PROCEDURE — 25000003 PHARM REV CODE 250: Performed by: STUDENT IN AN ORGANIZED HEALTH CARE EDUCATION/TRAINING PROGRAM

## 2020-05-04 PROCEDURE — 63600175 PHARM REV CODE 636 W HCPCS: Performed by: STUDENT IN AN ORGANIZED HEALTH CARE EDUCATION/TRAINING PROGRAM

## 2020-05-04 PROCEDURE — 90670 PCV13 VACCINE IM: CPT | Performed by: STUDENT IN AN ORGANIZED HEALTH CARE EDUCATION/TRAINING PROGRAM

## 2020-05-04 PROCEDURE — 27000221 HC OXYGEN, UP TO 24 HOURS

## 2020-05-04 PROCEDURE — 11000001 HC ACUTE MED/SURG PRIVATE ROOM

## 2020-05-04 PROCEDURE — 94761 N-INVAS EAR/PLS OXIMETRY MLT: CPT

## 2020-05-04 PROCEDURE — 90471 IMMUNIZATION ADMIN: CPT | Performed by: STUDENT IN AN ORGANIZED HEALTH CARE EDUCATION/TRAINING PROGRAM

## 2020-05-04 PROCEDURE — 99232 PR SUBSEQUENT HOSPITAL CARE,LEVL II: ICD-10-PCS | Mod: ,,, | Performed by: STUDENT IN AN ORGANIZED HEALTH CARE EDUCATION/TRAINING PROGRAM

## 2020-05-04 PROCEDURE — 99232 SBSQ HOSP IP/OBS MODERATE 35: CPT | Mod: ,,, | Performed by: STUDENT IN AN ORGANIZED HEALTH CARE EDUCATION/TRAINING PROGRAM

## 2020-05-04 RX ADMIN — AMLODIPINE BESYLATE 5 MG: 5 TABLET ORAL at 08:05

## 2020-05-04 RX ADMIN — HYDROCHLOROTHIAZIDE 25 MG: 25 TABLET ORAL at 08:05

## 2020-05-04 RX ADMIN — HYDRALAZINE HYDROCHLORIDE 10 MG: 20 INJECTION INTRAMUSCULAR; INTRAVENOUS at 11:05

## 2020-05-04 RX ADMIN — ATORVASTATIN CALCIUM 40 MG: 40 TABLET, FILM COATED ORAL at 08:05

## 2020-05-04 RX ADMIN — PNEUMOCOCCAL 13-VALENT CONJUGATE VACCINE 0.5 ML: 2.2; 2.2; 2.2; 2.2; 2.2; 4.4; 2.2; 2.2; 2.2; 2.2; 2.2; 2.2; 2.2 INJECTION, SUSPENSION INTRAMUSCULAR at 12:05

## 2020-05-04 NOTE — TELEPHONE ENCOUNTER
----- Message from Tiffanie Rogers sent at 5/4/2020  9:39 AM CDT -----  Contact: Self [phone]  Patient is calling to schedule a hospital follow up in 2 weeks per Dr. Barboza.       Called and spoke with patient's daughter. I scheduled the patient for a 2 week hospital follow up on 5/18/20. Patient's daughter verbally stated that she understands.

## 2020-05-04 NOTE — PLAN OF CARE
Patient to be discharged home today. Dr. Barboza's office to call patient with hospital follow appointment. Discharge rounds on patient. Discharge nurse will go over home medications and reasons for medications and final discharge instructions.          05/04/20 1041   Final Note   Assessment Type Final Discharge Note   Anticipated Discharge Disposition Home   What phone number can be called within the next 1-3 days to see how you are doing after discharge? [phone]   Discharge plans and expectations educations in teach back method with documentation complete? Yes   Right Care Referral Info   Post Acute Recommendation Other   Referral Type No care       Follow-up With  Details  Why  Contact Info   Kenneth Marie MD  On 5/22/2020  For your PCP appointment at [facility].  [address]  [place] [address]  [phone]   Brady Barboza MD  In 2 weeks  Dr Barboza's office will call you with your follow up apoointment for incarcerated ventral hernia  [address]  [address]  [facility] [address] [phone]

## 2020-05-04 NOTE — NURSING
Pt being discharged home in stable condition. AVS packet given to pt. Discharged instructions reviewed with pt. Pt demonstrated understanding using the teach back method.

## 2020-05-04 NOTE — DISCHARGE SUMMARY
[facility]  General Surgery  Discharge Summary      Patient Name: Reina Edwards  MRN: [id number]  Admission Date: 4/30/2020  Hospital Length of Stay: 3 days  Discharge Date and Time:  05/04/2020 9:31 AM  Attending Physician: Brady Barboza MD   Discharging Provider: Nicol Brantley MD  Primary Care Provider: Primary Doctor No     HPI: Reina Edwards is a 54 y.o. female w hx of morbid obesity (BMI 57), HTN, HLD, and recent hx of MI who presents with vomiting and abdominal pain.  States that she has had a midline hernia for about a year.  She says that she used to be able to reduce this, but recently can only partially reduce.  Of note, she says that she had an MI two months ago at [facility] that was treated only with medication.  She is on ASA and Plavix (but says she hasn't taken plavix in over a week since she ran out).     PSHx- hysterectomy through pfannenstiel, lap milton  PMHx- as above    Procedure(s) (LRB):  ROBOTIC REPAIR, HERNIA, VENTRAL (N/A)     Hospital Course: Patient was admitted on 4/30/20 and conservatively managed for a partial small bowel obstruction secondary to an incarcerated ventral hernia. She underwent NG tube decompression, which was then removed on 5/1. Her diet was advanced from clears to soft solids, which was well-tolerated. She was passing flatus and having bowel movements without nausea or vomiting. Her pain was well controlled without the need for narcotic pain medication. She was deemed fit for discharge on 5/4 with the instructions to follow up in two weeks for potential scheduling of an outpatient ventral hernia repair. Will need to get cardiac clearance from her cardiologist prior to surgery in light of her recent MI.    Consults: none    Significant Diagnostic Studies: Radiology: CT scan: CT ABDOMEN PELVIS   1. Prominent and mildly dilated loops of fluid-filled proximal and mid small bowel concerning for possible developing/partial small bowel obstruction with  transition point involving a ventral abdominal wall hernia as detailed above.  2. Hypodensity involving endometrial canal possibly relating to endometrial fluid or hyperplasia.  Further evaluation with pelvic ultrasound advised.  Uterine fibroid.  3. Hepatomegaly and findings suggestive of hepatic steatosis.  Correlation with LFTs advised.  4. Fluid within the distal esophagus which can be seen with dysmotility/reflux.  5. Slight basilar ground-glass opacity which can be seen with atelectasis possibly edema versus infectious or non infectious inflammatory process.  6. Cardiomegaly  7. Additional findings as discussed above.    Pending Diagnostic Studies:     None        Final Active Diagnoses:    Diagnosis Date Noted POA    PRINCIPAL PROBLEM:  Ventral hernia with obstruction and without gangrene [K43.6]  Yes    Partial bowel obstruction [K56.600] 04/30/2020 Yes    Type 2 diabetes mellitus, with long-term current use of insulin [E11.9, Z79.4] 04/30/2020 Not Applicable    Hyperlipidemia [E78.5] 08/18/2016 Yes    Morbid obesity with BMI of 50.0-59.9, adult [E66.01, Z68.43] 03/16/2016 Not Applicable    Essential hypertension [I10] 03/16/2016 Yes      Problems Resolved During this Admission:      Discharged Condition: good    Disposition: Home or Self Care    Follow Up:  Follow-up Information     Kenneth Marie MD On 5/22/2020.    Specialty:  Family Medicine  Why:  For your PCP appointment at [facility].  Contact information:  [address]  [place] [address] [phone]             Brady Barboza MD In 2 weeks.    Specialties:  Surgery, General Surgery  Why:  Follow up for incarcerated ventral hernia  Contact information:  [address]  [address]  [place] [address] [phone]                 Patient Instructions:      Diet diabetic     Notify your health care provider if you experience any of the following:  temperature >100.4     Notify your health care provider if you experience any of the  following:  persistent nausea and vomiting or diarrhea     Notify your health care provider if you experience any of the following:  severe uncontrolled pain     Activity as tolerated   Order Comments: Ok to shower.     Medications:  Reconciled Home Medications:      Medication List      CONTINUE taking these medications    atorvastatin 40 MG tablet  Commonly known as:  LIPITOR  Take 1 tablet (40 mg total) by mouth once daily.     hydroCHLOROthiazide 25 MG tablet  Commonly known as:  HYDRODIURIL  Take 1 tablet (25 mg total) by mouth once daily.     lisinopriL 40 MG tablet  Commonly known as:  PRINIVIL,ZESTRIL  Take 1 tablet (40 mg total) by mouth once daily.     metFORMIN 500 MG tablet  Commonly known as:  GLUCOPHAGE  Take 1 tablet (500 mg total) by mouth daily with breakfast.     nitroGLYCERIN 0.4 MG SL tablet  Commonly known as:  NITROSTAT  Place 1 tablet (0.4 mg total) under the tongue every 5 (five) minutes as needed for Chest pain (Take 1 after 15 minutes of chest pain, if chest pain persists, take a second pill and head to local Emergency Room).     sodium chloride 0.65 % nasal spray  Commonly known as:  OCEAN  1 spray by Nasal route as needed for Congestion.            Nicol Brantley MD  General Surgery  [facility]

## 2020-05-04 NOTE — PLAN OF CARE
Pt up in chair most of day, soft diet tolerated well. C/o of HA relieved with caffeine. No c/o pain, n/v, sob. No s/o distress noted.

## 2020-05-04 NOTE — PLAN OF CARE
Problem: Fall Injury Risk  Goal: Absence of Fall and Fall-Related Injury  Outcome: Ongoing, Progressing     Problem: Adult Inpatient Plan of Care  Goal: Plan of Care Review  Outcome: Ongoing, Progressing  Goal: Absence of Hospital-Acquired Illness or Injury  Outcome: Ongoing, Progressing  Goal: Optimal Comfort and Wellbeing  Outcome: Ongoing, Progressing

## 2020-05-06 ENCOUNTER — PATIENT OUTREACH (OUTPATIENT)
Dept: ADMINISTRATIVE | Facility: CLINIC | Age: 55
End: 2020-05-06

## 2020-05-06 RX ORDER — AMLODIPINE BESYLATE 10 MG/1
10 TABLET ORAL DAILY
COMMUNITY

## 2020-05-06 NOTE — PATIENT INSTRUCTIONS
After Laparoscopic Hernia Repair  You had a procedure called laparoscopic hernia repair. A hernia is a defect in the tough tissue covering the musculature of the abdominal wall (fascia). During laparoscopic hernia surgery, a surgeon inserts a telescope attached to a camera as well as surgical instruments through tiny incisions in your abdomen. The surgeon repairs the hernia with a mesh, which patches the tear or weakness in the fascia.  Home care  · Note that your shoulder may feel tight or your neck may be stiff for 24 to 48 hours after your surgery. This is common and usually lasts a short time. You may also have numbness around the incision area.  · Keep doing the coughing and deep breathing exercises that you learned in the hospital. These will help to prevent lung infection.  · Prevent constipation so you dont strain when going to the bathroom. Eat fruits, vegetables, and whole grains. Drink 6 to 8 glasses of water a day, unless otherwise directed. Use a laxative or a mild stool softener if your healthcare provider says its OK.  · Wash your incision with mild soap and water. Pat it dry. Dont use oil, powder, or lotion on your incision.  · Shower or take baths as instructed by your healthcare provider. Instructions will vary based on how your incision was closed and how its healing. It may be closed with glue, sutures, or staples. Your healthcare provider may have different advice for each kind.  Activity  · Ask others to help with chores and errands while you recover.  · Dont lift anything heavier than 10 pounds until your healthcare provider says its OK.  · Dont mow the lawn, use a vacuum , or do other strenuous activities until your healthcare provider says it's OK.  · Climb stairs slowly and pause after every few steps.  · Walk as often as you feel able.  · Ask your healthcare provider when you can drive again. This may be when you stop taking pain medicine and can move comfortably from side  to side. Dont drive if you are still taking opioid pain medicine.  When to call your healthcare provider   Call your healthcare provider right away if you have any of the following:  · Pain, bleeding, redness, or fluid at the incision site that gets worse  · Fever of 100.4°F (38°C) or higher, or as directed by your healthcare provider  · Vomiting or nausea that doesnt go away  · Inability to urinate  · No bowel movement after 3 days  · Swelling in abdomen or groin that gets worse  · Pain thats not relieved by medicine     © 1192-8207 [employer]. [address] [place] [address]. All rights reserved. This information is not intended as a substitute for professional medical care. Always follow your healthcare professional's instructions.

## 2020-05-18 ENCOUNTER — OFFICE VISIT (OUTPATIENT)
Dept: SURGERY | Facility: CLINIC | Age: 55
End: 2020-05-18

## 2020-05-18 VITALS
BODY MASS INDEX: 57.02 KG/M2 | HEART RATE: 83 BPM | HEIGHT: 60 IN | TEMPERATURE: 98 F | WEIGHT: 290.44 LBS | SYSTOLIC BLOOD PRESSURE: 139 MMHG | DIASTOLIC BLOOD PRESSURE: 82 MMHG

## 2020-05-18 DIAGNOSIS — K43.6 VENTRAL HERNIA WITH OBSTRUCTION AND WITHOUT GANGRENE: ICD-10-CM

## 2020-05-18 DIAGNOSIS — K56.690 OTHER PARTIAL INTESTINAL OBSTRUCTION: ICD-10-CM

## 2020-05-18 DIAGNOSIS — E66.01 MORBID OBESITY WITH BMI OF 50.0-59.9, ADULT: ICD-10-CM

## 2020-05-18 DIAGNOSIS — I25.2 HISTORY OF HEART ATTACK: Primary | ICD-10-CM

## 2020-05-18 PROCEDURE — 99214 PR OFFICE/OUTPT VISIT, EST, LEVL IV, 30-39 MIN: ICD-10-PCS | Mod: S$PBB,,, | Performed by: STUDENT IN AN ORGANIZED HEALTH CARE EDUCATION/TRAINING PROGRAM

## 2020-05-18 PROCEDURE — 99999 PR PBB SHADOW E&M-EST. PATIENT-LVL III: ICD-10-PCS | Mod: PBBFAC,,, | Performed by: STUDENT IN AN ORGANIZED HEALTH CARE EDUCATION/TRAINING PROGRAM

## 2020-05-18 PROCEDURE — 99999 PR PBB SHADOW E&M-EST. PATIENT-LVL III: CPT | Mod: PBBFAC,,, | Performed by: STUDENT IN AN ORGANIZED HEALTH CARE EDUCATION/TRAINING PROGRAM

## 2020-05-18 PROCEDURE — 99213 OFFICE O/P EST LOW 20 MIN: CPT | Mod: PBBFAC,PO | Performed by: STUDENT IN AN ORGANIZED HEALTH CARE EDUCATION/TRAINING PROGRAM

## 2020-05-18 PROCEDURE — 99214 OFFICE O/P EST MOD 30 MIN: CPT | Mod: S$PBB,,, | Performed by: STUDENT IN AN ORGANIZED HEALTH CARE EDUCATION/TRAINING PROGRAM

## 2020-05-22 NOTE — PROGRESS NOTES
Patient ID: Reina Edwards is a 54 y.o. female.    Chief Complaint: No chief complaint on file.      HPI:  54F recently admitted for incarcerated ventral hernia with significant pain. Pain resolved while in house and her obstructive symptoms did as well. Now feeling well. Eating well. Denies fevers, cough, SOB, chest pain. Hx of recent MI. Has not seen cardiology lately. Not on plavix or antiplatelet.       Review of Systems   Constitutional: Negative for fever.   HENT: Negative for trouble swallowing.    Respiratory: Negative for shortness of breath.    Cardiovascular: Negative for chest pain.   Gastrointestinal: Positive for abdominal pain. Negative for blood in stool, nausea and vomiting.   Genitourinary: Negative for dysuria.   Musculoskeletal: Negative for gait problem.   Skin: Negative for rash and wound.   Allergic/Immunologic: Negative for immunocompromised state.   Neurological: Negative for weakness.   Hematological: Does not bruise/bleed easily.   Psychiatric/Behavioral: Negative for agitation.       Current Outpatient Medications   Medication Sig Dispense Refill    amLODIPine (NORVASC) 10 MG tablet Take 10 mg by mouth once daily.      sodium chloride (OCEAN) 0.65 % nasal spray 1 spray by Nasal route as needed for Congestion. 15 mL 0    atorvastatin (LIPITOR) 40 MG tablet Take 1 tablet (40 mg total) by mouth once daily. 30 tablet 2    hydrochlorothiazide (HYDRODIURIL) 25 MG tablet Take 1 tablet (25 mg total) by mouth once daily. 30 tablet 2    lisinopril (PRINIVIL,ZESTRIL) 40 MG tablet Take 1 tablet (40 mg total) by mouth once daily. 30 tablet 2    metformin (GLUCOPHAGE) 500 MG tablet Take 1 tablet (500 mg total) by mouth daily with breakfast. 60 tablet 3    nitroGLYCERIN (NITROSTAT) 0.4 MG SL tablet Place 1 tablet (0.4 mg total) under the tongue every 5 (five) minutes as needed for Chest pain (Take 1 after 15 minutes of chest pain, if chest pain persists, take a second pill and head to local  Emergency Room). (Patient not taking: Reported on 2020) 30 tablet 0     No current facility-administered medications for this visit.        Review of patient's allergies indicates:  No Known Allergies    Past Medical History:   Diagnosis Date    Coronary artery disease     Diabetes mellitus     Hypertension     MI (myocardial infarction) 2020       Past Surgical History:   Procedure Laterality Date     SECTION      CHOLECYSTECTOMY         History reviewed. No pertinent family history.    Social History     Socioeconomic History    Marital status: Single     Spouse name: Not on file    Number of children: Not on file    Years of education: Not on file    Highest education level: Not on file   Occupational History    Not on file   Social Needs    Financial resource strain: Not on file    Food insecurity:     Worry: Not on file     Inability: Not on file    Transportation needs:     Medical: Not on file     Non-medical: Not on file   Tobacco Use    Smoking status: Never Smoker    Smokeless tobacco: Never Used   Substance and Sexual Activity    Alcohol use: No    Drug use: No    Sexual activity: Not Currently   Lifestyle    Physical activity:     Days per week: Not on file     Minutes per session: Not on file    Stress: Not on file   Relationships    Social connections:     Talks on phone: Not on file     Gets together: Not on file     Attends Mosque service: Not on file     Active member of club or organization: Not on file     Attends meetings of clubs or organizations: Not on file     Relationship status: Not on file   Other Topics Concern    Not on file   Social History Narrative    Not on file       Vitals:    20 0940   BP: 139/82   Pulse: 83   Temp: 98 °F (36.7 °C)       Physical Exam   Constitutional: She is oriented to person, place, and time. She appears well-developed and well-nourished. No distress.   HENT:   Head: Normocephalic and atraumatic.   Eyes: No  scleral icterus.   Cardiovascular: Normal rate.   Pulmonary/Chest: Effort normal. No stridor.   Abdominal: Soft. She exhibits no distension. There is no tenderness.   Obese  Ventral hernia central abdomen, nontender today. Not reducible   Lymphadenopathy:     She has no cervical adenopathy.   Neurological: She is alert and oriented to person, place, and time.   Skin: Skin is warm. No erythema.   Psychiatric: She has a normal mood and affect. Her behavior is normal.   Body mass index is 56.73 kg/m².      CT shows ventral hernia 5cm wide defect in central abdomen.     Assessment & Plan:   54F French speaking with large VHR. Obstructive symptoms resolved with conservative management for now. High likelihood of recurrence.   Wants to follow up with cards here at [facility]  Needs to lose weight, discussed with patient  Plan for robot ventral hernia repair at some point once cleared by cards, probably not for a few more months. MI in march of this year  RTC in 2 months

## 2020-06-09 ENCOUNTER — OFFICE VISIT (OUTPATIENT)
Dept: CARDIOLOGY | Facility: CLINIC | Age: 55
End: 2020-06-09

## 2020-06-09 VITALS
SYSTOLIC BLOOD PRESSURE: 145 MMHG | OXYGEN SATURATION: 92 % | DIASTOLIC BLOOD PRESSURE: 85 MMHG | BODY MASS INDEX: 56.59 KG/M2 | WEIGHT: 288.25 LBS | HEART RATE: 85 BPM | HEIGHT: 60 IN

## 2020-06-09 DIAGNOSIS — Z79.4 TYPE 2 DIABETES MELLITUS WITH OTHER SPECIFIED COMPLICATION, WITH LONG-TERM CURRENT USE OF INSULIN: ICD-10-CM

## 2020-06-09 DIAGNOSIS — I25.2 HISTORY OF MI (MYOCARDIAL INFARCTION): ICD-10-CM

## 2020-06-09 DIAGNOSIS — K43.6 VENTRAL HERNIA WITH OBSTRUCTION AND WITHOUT GANGRENE: ICD-10-CM

## 2020-06-09 DIAGNOSIS — E78.5 HYPERLIPIDEMIA, UNSPECIFIED HYPERLIPIDEMIA TYPE: ICD-10-CM

## 2020-06-09 DIAGNOSIS — E11.69 TYPE 2 DIABETES MELLITUS WITH OTHER SPECIFIED COMPLICATION, WITH LONG-TERM CURRENT USE OF INSULIN: ICD-10-CM

## 2020-06-09 DIAGNOSIS — K56.690 OTHER PARTIAL INTESTINAL OBSTRUCTION: ICD-10-CM

## 2020-06-09 DIAGNOSIS — I10 HTN (HYPERTENSION): ICD-10-CM

## 2020-06-09 DIAGNOSIS — I10 ESSENTIAL HYPERTENSION: ICD-10-CM

## 2020-06-09 DIAGNOSIS — E66.01 MORBID OBESITY WITH BMI OF 50.0-59.9, ADULT: ICD-10-CM

## 2020-06-09 DIAGNOSIS — I25.2 HISTORY OF HEART ATTACK: Primary | ICD-10-CM

## 2020-06-09 PROCEDURE — 99499 NO LOS: ICD-10-PCS | Mod: S$PBB,,, | Performed by: INTERNAL MEDICINE

## 2020-06-09 PROCEDURE — 99214 OFFICE O/P EST MOD 30 MIN: CPT | Mod: PBBFAC,PO | Performed by: INTERNAL MEDICINE

## 2020-06-09 PROCEDURE — 99499 UNLISTED E&M SERVICE: CPT | Mod: S$PBB,,, | Performed by: INTERNAL MEDICINE

## 2020-06-09 NOTE — LETTER
June 9, 2020      Brady Barboza MD  [address]  [address]  [facility] [address]           [facility]  [address]  [facility] [address]  Phone: [phone]          Patient: Reina Edwards   MR Number: [id number]   YOB: 1965   Date of Visit: 6/9/2020       Dear Dr. Brady Barboza:    Thank you for referring Reina Edwards to me for evaluation. Attached you will find relevant portions of my assessment and plan of care.    If you have questions, please do not hesitate to call me. I look forward to following Reina Edwards along with you.    Sincerely,    Arash Cantor MD    Enclosure  CC:  No Recipients    If you would like to receive this communication electronically, please contact [email or web address] or [phone] to request more information on [employer] access.    For providers and/or their staff who would like to refer a patient to [facility], please contact us through our one-stop-shop provider referral line, [facility] , at [phone].    If you feel you have received this communication in error or would no longer like to receive these types of communications, please e-mail [email or web address]

## 2020-06-09 NOTE — PROGRESS NOTES
Patient not seen because out of pocket costs and has appointments with [facility] already

## 2020-06-21 ENCOUNTER — HOSPITAL ENCOUNTER (INPATIENT)
Facility: HOSPITAL | Age: 55
LOS: 4 days | Discharge: HOME | DRG: 354 | End: 2020-06-26
Attending: EMERGENCY MEDICINE | Admitting: STUDENT IN AN ORGANIZED HEALTH CARE EDUCATION/TRAINING PROGRAM

## 2020-06-21 DIAGNOSIS — I25.10 CAD (CORONARY ARTERY DISEASE): ICD-10-CM

## 2020-06-21 DIAGNOSIS — K56.609 SMALL BOWEL OBSTRUCTION: ICD-10-CM

## 2020-06-21 DIAGNOSIS — K43.6 VENTRAL HERNIA WITH OBSTRUCTION AND WITHOUT GANGRENE: Primary | ICD-10-CM

## 2020-06-21 DIAGNOSIS — R11.0 NAUSEA: ICD-10-CM

## 2020-06-21 DIAGNOSIS — R10.9 ABDOMINAL PAIN: ICD-10-CM

## 2020-06-21 PROCEDURE — 84484 ASSAY OF TROPONIN QUANT: CPT

## 2020-06-21 PROCEDURE — 99285 EMERGENCY DEPT VISIT HI MDM: CPT | Mod: 25

## 2020-06-21 PROCEDURE — 93010 ELECTROCARDIOGRAM REPORT: CPT | Mod: ,,, | Performed by: INTERNAL MEDICINE

## 2020-06-21 PROCEDURE — 96374 THER/PROPH/DIAG INJ IV PUSH: CPT

## 2020-06-21 PROCEDURE — 85610 PROTHROMBIN TIME: CPT

## 2020-06-21 PROCEDURE — 83880 ASSAY OF NATRIURETIC PEPTIDE: CPT

## 2020-06-21 PROCEDURE — 93005 ELECTROCARDIOGRAM TRACING: CPT

## 2020-06-21 PROCEDURE — 96375 TX/PRO/DX INJ NEW DRUG ADDON: CPT

## 2020-06-21 PROCEDURE — 83690 ASSAY OF LIPASE: CPT

## 2020-06-21 PROCEDURE — 80053 COMPREHEN METABOLIC PANEL: CPT

## 2020-06-21 PROCEDURE — 93010 EKG 12-LEAD: ICD-10-PCS | Mod: ,,, | Performed by: INTERNAL MEDICINE

## 2020-06-21 PROCEDURE — 83605 ASSAY OF LACTIC ACID: CPT

## 2020-06-21 PROCEDURE — 84443 ASSAY THYROID STIM HORMONE: CPT

## 2020-06-21 PROCEDURE — 85730 THROMBOPLASTIN TIME PARTIAL: CPT

## 2020-06-21 PROCEDURE — 83735 ASSAY OF MAGNESIUM: CPT

## 2020-06-21 RX ORDER — MORPHINE SULFATE 2 MG/ML
6 INJECTION, SOLUTION INTRAMUSCULAR; INTRAVENOUS
Status: COMPLETED | OUTPATIENT
Start: 2020-06-21 | End: 2020-06-22

## 2020-06-21 RX ORDER — ONDANSETRON 2 MG/ML
8 INJECTION INTRAMUSCULAR; INTRAVENOUS
Status: COMPLETED | OUTPATIENT
Start: 2020-06-21 | End: 2020-06-22

## 2020-06-22 ENCOUNTER — ANESTHESIA EVENT (OUTPATIENT)
Dept: SURGERY | Facility: HOSPITAL | Age: 55
DRG: 354 | End: 2020-06-22

## 2020-06-22 LAB
ALBUMIN SERPL BCP-MCNC: 4 G/DL (ref 3.5–5.2)
ALP SERPL-CCNC: 94 U/L (ref 55–135)
ALT SERPL W/O P-5'-P-CCNC: 25 U/L (ref 10–44)
ANION GAP SERPL CALC-SCNC: 11 MMOL/L (ref 8–16)
AORTIC ROOT ANNULUS: 3.13 CM
AORTIC VALVE CUSP SEPERATION: 4.88 CM
APTT BLDCRRT: 24 SEC (ref 21–32)
AST SERPL-CCNC: 20 U/L (ref 10–40)
AV INDEX (PROSTH): 0.91
AV MEAN GRADIENT: 6 MMHG
AV PEAK GRADIENT: 14 MMHG
AV VELOCITY RATIO: 0.78
BACTERIA #/AREA URNS HPF: NORMAL /HPF
BASOPHILS # BLD AUTO: 0.01 K/UL (ref 0–0.2)
BASOPHILS NFR BLD: 0.1 % (ref 0–1.9)
BILIRUB SERPL-MCNC: 0.6 MG/DL (ref 0.1–1)
BILIRUB UR QL STRIP: NEGATIVE
BNP SERPL-MCNC: 33 PG/ML (ref 0–99)
BSA FOR ECHO PROCEDURE: 2.35 M2
BUN SERPL-MCNC: 14 MG/DL (ref 6–20)
CALCIUM SERPL-MCNC: 9.7 MG/DL (ref 8.7–10.5)
CHLORIDE SERPL-SCNC: 100 MMOL/L (ref 95–110)
CLARITY UR: CLEAR
CO2 SERPL-SCNC: 29 MMOL/L (ref 23–29)
COLOR UR: YELLOW
CREAT SERPL-MCNC: 0.8 MG/DL (ref 0.5–1.4)
CV ECHO LV RWT: 0.42 CM
DIFFERENTIAL METHOD: ABNORMAL
DOP CALC AO PEAK VEL: 1.89 M/S
DOP CALC AO VTI: 32.11 CM
DOP CALC LVOT PEAK VEL: 1.48 M/S
DOP CALCLVOT PEAK VEL VTI: 29.2 CM
E WAVE DECELERATION TIME: 279.65 MSEC
E/A RATIO: 0.67
E/E' RATIO: 13.38 M/S
ECHO LV POSTERIOR WALL: 1.08 CM (ref 0.6–1.1)
EOSINOPHIL # BLD AUTO: 0.1 K/UL (ref 0–0.5)
EOSINOPHIL NFR BLD: 0.5 % (ref 0–8)
ERYTHROCYTE [DISTWIDTH] IN BLOOD BY AUTOMATED COUNT: 15.4 % (ref 11.5–14.5)
EST. GFR  (AFRICAN AMERICAN): >60 ML/MIN/1.73 M^2
EST. GFR  (NON AFRICAN AMERICAN): >60 ML/MIN/1.73 M^2
ESTIMATED AVG GLUCOSE: 140 MG/DL (ref 68–131)
FRACTIONAL SHORTENING: 50 % (ref 28–44)
GLUCOSE SERPL-MCNC: 130 MG/DL (ref 70–110)
GLUCOSE UR QL STRIP: NEGATIVE
HBA1C MFR BLD HPLC: 6.5 % (ref 4–5.6)
HCT VFR BLD AUTO: 44.4 % (ref 37–48.5)
HGB BLD-MCNC: 13.9 G/DL (ref 12–16)
HGB UR QL STRIP: NEGATIVE
HYALINE CASTS #/AREA URNS LPF: 0 /LPF
IMM GRANULOCYTES # BLD AUTO: 0.04 K/UL (ref 0–0.04)
IMM GRANULOCYTES NFR BLD AUTO: 0.4 % (ref 0–0.5)
INR PPP: 1 (ref 0.8–1.2)
INTERVENTRICULAR SEPTUM: 1.07 CM (ref 0.6–1.1)
IVRT: 88.49 MSEC
KETONES UR QL STRIP: NEGATIVE
LA MAJOR: 5.79 CM
LA MINOR: 5.68 CM
LACTATE SERPL-SCNC: 1.1 MMOL/L (ref 0.5–2.2)
LEFT ATRIUM SIZE: 4.06 CM
LEFT INTERNAL DIMENSION IN SYSTOLE: 2.57 CM (ref 2.1–4)
LEFT VENTRICLE DIASTOLIC VOLUME INDEX: 58.9 ML/M2
LEFT VENTRICLE DIASTOLIC VOLUME: 128.19 ML
LEFT VENTRICLE MASS INDEX: 98 G/M2
LEFT VENTRICLE SYSTOLIC VOLUME INDEX: 11 ML/M2
LEFT VENTRICLE SYSTOLIC VOLUME: 23.85 ML
LEFT VENTRICULAR INTERNAL DIMENSION IN DIASTOLE: 5.18 CM (ref 3.5–6)
LEFT VENTRICULAR MASS: 212.63 G
LEUKOCYTE ESTERASE UR QL STRIP: NEGATIVE
LIPASE SERPL-CCNC: 19 U/L (ref 4–60)
LV LATERAL E/E' RATIO: 10.88 M/S
LV SEPTAL E/E' RATIO: 17.4 M/S
LYMPHOCYTES # BLD AUTO: 1.6 K/UL (ref 1–4.8)
LYMPHOCYTES NFR BLD: 15.6 % (ref 18–48)
MAGNESIUM SERPL-MCNC: 1.9 MG/DL (ref 1.6–2.6)
MCH RBC QN AUTO: 27.9 PG (ref 27–31)
MCHC RBC AUTO-ENTMCNC: 31.3 G/DL (ref 32–36)
MCV RBC AUTO: 89 FL (ref 82–98)
MICROSCOPIC COMMENT: NORMAL
MONOCYTES # BLD AUTO: 0.5 K/UL (ref 0.3–1)
MONOCYTES NFR BLD: 4.7 % (ref 4–15)
MV PEAK A VEL: 1.29 M/S
MV PEAK E VEL: 0.87 M/S
MV STENOSIS PRESSURE HALF TIME: 81.1 MS
MV VALVE AREA P 1/2 METHOD: 2.71 CM2
NEUTROPHILS # BLD AUTO: 8.1 K/UL (ref 1.8–7.7)
NEUTROPHILS NFR BLD: 78.7 % (ref 38–73)
NITRITE UR QL STRIP: NEGATIVE
NRBC BLD-RTO: 0 /100 WBC
PH UR STRIP: 7 [PH] (ref 5–8)
PISA TR MAX VEL: 3.66 M/S
PLATELET # BLD AUTO: 251 K/UL (ref 150–350)
PMV BLD AUTO: 9.5 FL (ref 9.2–12.9)
POCT GLUCOSE: 102 MG/DL (ref 70–110)
POCT GLUCOSE: 108 MG/DL (ref 70–110)
POTASSIUM SERPL-SCNC: 3.7 MMOL/L (ref 3.5–5.1)
PROT SERPL-MCNC: 8.2 G/DL (ref 6–8.4)
PROT UR QL STRIP: ABNORMAL
PROTHROMBIN TIME: 10.3 SEC (ref 9–12.5)
PULM VEIN S/D RATIO: 1.38
PV PEAK D VEL: 0.4 M/S
PV PEAK S VEL: 0.55 M/S
RA MAJOR: 5.34 CM
RA PRESSURE: 3 MMHG
RA WIDTH: 3.76 CM
RBC # BLD AUTO: 4.98 M/UL (ref 4–5.4)
RBC #/AREA URNS HPF: 1 /HPF (ref 0–4)
SARS-COV-2 RDRP RESP QL NAA+PROBE: NEGATIVE
SODIUM SERPL-SCNC: 140 MMOL/L (ref 136–145)
SP GR UR STRIP: 1.02 (ref 1–1.03)
SQUAMOUS #/AREA URNS HPF: 4 /HPF
TDI LATERAL: 0.08 M/S
TDI SEPTAL: 0.05 M/S
TDI: 0.07 M/S
TR MAX PG: 54 MMHG
TRICUSPID ANNULAR PLANE SYSTOLIC EXCURSION: 1.4 CM
TROPONIN I SERPL DL<=0.01 NG/ML-MCNC: 0.02 NG/ML (ref 0–0.03)
TSH SERPL DL<=0.005 MIU/L-ACNC: 3.85 UIU/ML (ref 0.4–4)
TV REST PULMONARY ARTERY PRESSURE: 57 MMHG
URN SPEC COLLECT METH UR: ABNORMAL
UROBILINOGEN UR STRIP-ACNC: NEGATIVE EU/DL
WBC # BLD AUTO: 10.3 K/UL (ref 3.9–12.7)
WBC #/AREA URNS HPF: 4 /HPF (ref 0–5)

## 2020-06-22 PROCEDURE — 85025 COMPLETE CBC W/AUTO DIFF WBC: CPT

## 2020-06-22 PROCEDURE — U0002 COVID-19 LAB TEST NON-CDC: HCPCS

## 2020-06-22 PROCEDURE — 99222 PR INITIAL HOSPITAL CARE,LEVL II: ICD-10-PCS | Mod: ,,, | Performed by: STUDENT IN AN ORGANIZED HEALTH CARE EDUCATION/TRAINING PROGRAM

## 2020-06-22 PROCEDURE — 94761 N-INVAS EAR/PLS OXIMETRY MLT: CPT

## 2020-06-22 PROCEDURE — 63600175 PHARM REV CODE 636 W HCPCS: Performed by: EMERGENCY MEDICINE

## 2020-06-22 PROCEDURE — 83036 HEMOGLOBIN GLYCOSYLATED A1C: CPT

## 2020-06-22 PROCEDURE — 63600175 PHARM REV CODE 636 W HCPCS: Performed by: STUDENT IN AN ORGANIZED HEALTH CARE EDUCATION/TRAINING PROGRAM

## 2020-06-22 PROCEDURE — 81000 URINALYSIS NONAUTO W/SCOPE: CPT

## 2020-06-22 PROCEDURE — 36415 COLL VENOUS BLD VENIPUNCTURE: CPT

## 2020-06-22 PROCEDURE — 99222 1ST HOSP IP/OBS MODERATE 55: CPT | Mod: ,,, | Performed by: STUDENT IN AN ORGANIZED HEALTH CARE EDUCATION/TRAINING PROGRAM

## 2020-06-22 PROCEDURE — 11000001 HC ACUTE MED/SURG PRIVATE ROOM

## 2020-06-22 PROCEDURE — 27000221 HC OXYGEN, UP TO 24 HOURS

## 2020-06-22 PROCEDURE — 25000003 PHARM REV CODE 250: Performed by: EMERGENCY MEDICINE

## 2020-06-22 PROCEDURE — 99255 PR INITIAL INPATIENT CONSULT,LEVL V: ICD-10-PCS | Mod: ,,, | Performed by: INTERNAL MEDICINE

## 2020-06-22 PROCEDURE — 99255 IP/OBS CONSLTJ NEW/EST HI 80: CPT | Mod: ,,, | Performed by: INTERNAL MEDICINE

## 2020-06-22 PROCEDURE — 99900035 HC TECH TIME PER 15 MIN (STAT)

## 2020-06-22 PROCEDURE — 25500020 PHARM REV CODE 255: Performed by: EMERGENCY MEDICINE

## 2020-06-22 PROCEDURE — S0028 INJECTION, FAMOTIDINE, 20 MG: HCPCS | Performed by: EMERGENCY MEDICINE

## 2020-06-22 RX ORDER — PROCHLORPERAZINE EDISYLATE 5 MG/ML
5 INJECTION INTRAMUSCULAR; INTRAVENOUS EVERY 6 HOURS PRN
Status: DISCONTINUED | OUTPATIENT
Start: 2020-06-22 | End: 2020-06-26 | Stop reason: HOSPADM

## 2020-06-22 RX ORDER — KETOROLAC TROMETHAMINE 30 MG/ML
15 INJECTION, SOLUTION INTRAMUSCULAR; INTRAVENOUS EVERY 6 HOURS PRN
Status: DISPENSED | OUTPATIENT
Start: 2020-06-22 | End: 2020-06-25

## 2020-06-22 RX ORDER — SODIUM CHLORIDE 0.9 % (FLUSH) 0.9 %
10 SYRINGE (ML) INJECTION
Status: DISCONTINUED | OUTPATIENT
Start: 2020-06-22 | End: 2020-06-26 | Stop reason: HOSPADM

## 2020-06-22 RX ORDER — SODIUM CHLORIDE, SODIUM LACTATE, POTASSIUM CHLORIDE, CALCIUM CHLORIDE 600; 310; 30; 20 MG/100ML; MG/100ML; MG/100ML; MG/100ML
INJECTION, SOLUTION INTRAVENOUS CONTINUOUS
Status: DISCONTINUED | OUTPATIENT
Start: 2020-06-22 | End: 2020-06-24

## 2020-06-22 RX ORDER — FAMOTIDINE 10 MG/ML
20 INJECTION INTRAVENOUS EVERY 12 HOURS
Status: DISCONTINUED | OUTPATIENT
Start: 2020-06-22 | End: 2020-06-24

## 2020-06-22 RX ORDER — ONDANSETRON 2 MG/ML
4 INJECTION INTRAMUSCULAR; INTRAVENOUS EVERY 8 HOURS PRN
Status: DISCONTINUED | OUTPATIENT
Start: 2020-06-22 | End: 2020-06-26 | Stop reason: HOSPADM

## 2020-06-22 RX ORDER — INSULIN ASPART 100 [IU]/ML
0-5 INJECTION, SOLUTION INTRAVENOUS; SUBCUTANEOUS EVERY 6 HOURS PRN
Status: DISCONTINUED | OUTPATIENT
Start: 2020-06-22 | End: 2020-06-26 | Stop reason: HOSPADM

## 2020-06-22 RX ORDER — GLUCAGON 1 MG
1 KIT INJECTION
Status: DISCONTINUED | OUTPATIENT
Start: 2020-06-22 | End: 2020-06-26 | Stop reason: HOSPADM

## 2020-06-22 RX ORDER — MORPHINE SULFATE 4 MG/ML
4 INJECTION, SOLUTION INTRAMUSCULAR; INTRAVENOUS EVERY 4 HOURS PRN
Status: DISCONTINUED | OUTPATIENT
Start: 2020-06-22 | End: 2020-06-26 | Stop reason: HOSPADM

## 2020-06-22 RX ORDER — TALC
6 POWDER (GRAM) TOPICAL NIGHTLY PRN
Status: DISCONTINUED | OUTPATIENT
Start: 2020-06-22 | End: 2020-06-26 | Stop reason: HOSPADM

## 2020-06-22 RX ADMIN — MORPHINE SULFATE 4 MG: 4 INJECTION INTRAVENOUS at 04:06

## 2020-06-22 RX ADMIN — IOHEXOL 100 ML: 350 INJECTION, SOLUTION INTRAVENOUS at 01:06

## 2020-06-22 RX ADMIN — MORPHINE SULFATE 6 MG: 2 INJECTION, SOLUTION INTRAMUSCULAR; INTRAVENOUS at 12:06

## 2020-06-22 RX ADMIN — FAMOTIDINE 20 MG: 10 INJECTION, SOLUTION INTRAVENOUS at 09:06

## 2020-06-22 RX ADMIN — ONDANSETRON 4 MG: 2 INJECTION INTRAMUSCULAR; INTRAVENOUS at 03:06

## 2020-06-22 RX ADMIN — FAMOTIDINE 20 MG: 10 INJECTION, SOLUTION INTRAVENOUS at 08:06

## 2020-06-22 RX ADMIN — PROCHLORPERAZINE EDISYLATE 5 MG: 5 INJECTION INTRAMUSCULAR; INTRAVENOUS at 05:06

## 2020-06-22 RX ADMIN — SODIUM CHLORIDE, SODIUM LACTATE, POTASSIUM CHLORIDE, AND CALCIUM CHLORIDE: .6; .31; .03; .02 INJECTION, SOLUTION INTRAVENOUS at 01:06

## 2020-06-22 RX ADMIN — ONDANSETRON 8 MG: 2 INJECTION INTRAMUSCULAR; INTRAVENOUS at 12:06

## 2020-06-22 RX ADMIN — KETOROLAC TROMETHAMINE 15 MG: 30 INJECTION, SOLUTION INTRAMUSCULAR at 05:06

## 2020-06-22 NOTE — ED PROVIDER NOTES
Encounter Date: 2020    SCRIBE #1 NOTE: I, Amandeep Tillman , am scribing for, and in the presence of,  . I have scribed the entire note.       History     Chief Complaint   Patient presents with    Abdominal Pain     Complaining of abdominal pain.  Hx of hernia.  EMT states pt took five 325 mg tablets of ASA for the pain.  EMT called poison control.     Reina Edwards is a 54 y.o. female who  has a past medical history of Coronary artery disease, Diabetes mellitus, Hypertension, and MI (myocardial infarction) (2020).      54-year-old female who presents the ER for evaluation of abdominal pain.  Onset about 2:00 p.m. with nausea.  Reports she has a ventral hernia, and believes this is the source of her pain.  Her last bowel movement was 4:00 p.m., it was soft.  She denies nausea, fever, chest pain or shortness of breath.              The history is provided by the patient. A  was used.     Review of patient's allergies indicates:  No Known Allergies  Past Medical History:   Diagnosis Date    Coronary artery disease     Diabetes mellitus     Hypertension     MI (myocardial infarction) 2020     Past Surgical History:   Procedure Laterality Date     SECTION      CHOLECYSTECTOMY       History reviewed. No pertinent family history.  Social History     Tobacco Use    Smoking status: Never Smoker    Smokeless tobacco: Never Used   Substance Use Topics    Alcohol use: No    Drug use: No     Review of Systems   Constitutional: Negative for fever.   Gastrointestinal: Positive for abdominal pain and nausea. Negative for diarrhea and vomiting.   All other systems reviewed and are negative.      Physical Exam     Initial Vitals [20 2251]   BP Pulse Resp Temp SpO2   (!) 208/95 88 18 99.6 °F (37.6 °C) (!) 92 %      MAP       --         Physical Exam    Nursing note and vitals reviewed.  Constitutional: She is not diaphoretic. No distress.   HENT:   Head: Normocephalic  and atraumatic.   Mouth/Throat: Oropharynx is clear and moist.   Eyes: EOM are normal. Pupils are equal, round, and reactive to light.   Neck: No tracheal deviation present.   Cardiovascular: Normal rate, regular rhythm, normal heart sounds and intact distal pulses.   Pulmonary/Chest: Breath sounds normal. No stridor. No respiratory distress. She has no wheezes.   Abdominal: Soft. Bowel sounds are normal. She exhibits distension. She exhibits no mass. There is no abdominal tenderness.   Large distended obese abdomen  Ventral hernia which is tender to palpation    Musculoskeletal: No edema.   Neurological: She is alert and oriented to person, place, and time. She has normal strength. No cranial nerve deficit or sensory deficit.   Skin: Skin is warm and dry. Capillary refill takes less than 2 seconds. No pallor.   Psychiatric: She has a normal mood and affect. Her behavior is normal. Thought content normal.         ED Course   Procedures  Labs Reviewed   COMPREHENSIVE METABOLIC PANEL - Abnormal; Notable for the following components:       Result Value    Glucose 130 (*)     All other components within normal limits   URINALYSIS, REFLEX TO URINE CULTURE - Abnormal; Notable for the following components:    Protein, UA 1+ (*)     All other components within normal limits    Narrative:     Preferred Collection Type->Urine, Clean Catch  Specimen Source->Urine   CBC W/ AUTO DIFFERENTIAL - Abnormal; Notable for the following components:    Mean Corpuscular Hemoglobin Conc 31.3 (*)     RDW 15.4 (*)     Gran # (ANC) 8.1 (*)     Gran% 78.7 (*)     Lymph% 15.6 (*)     All other components within normal limits   B-TYPE NATRIURETIC PEPTIDE   LACTIC ACID, PLASMA   LIPASE   MAGNESIUM   TROPONIN I   TSH   PROTIME-INR   APTT   SARS-COV-2 RNA AMPLIFICATION, QUAL   URINALYSIS MICROSCOPIC    Narrative:     Preferred Collection Type->Urine, Clean Catch  Specimen Source->Urine          Imaging Results          CT Abdomen Pelvis With  Contrast (Final result)  Result time 06/22/20 01:23:22    Final result by Tamie Krishnan MD (06/22/20 01:23:22)                 Impression:      Small-bowel obstruction with transition point seen involving lower anterior abdominal wall ventral hernia.    Postsurgical changes and additional findings as detailed above.      Electronically signed by: Tamie Krishnan MD  Date:    06/22/2020  Time:    01:23             Narrative:    EXAMINATION:  CT ABDOMEN PELVIS WITH CONTRAST    CLINICAL HISTORY:  Hernia, complicated;Abdominal pain nausea, concern for car straight ventral hernia;    TECHNIQUE:  Low dose axial images, sagittal and coronal reformations were obtained from the lung bases to the pubic symphysis following the IV administration of 100 mL of Omnipaque 350 .  Oral contrast was not given.    COMPARISON:  CT abdomen and pelvis from 04/30/2020.    FINDINGS:  The visualized portion of the heart is unremarkable.  The lung bases are clear.    Liver is enlarged measuring 23 cm and diffusely hypoattenuating in appearance likely reflecting diffuse fatty infiltration.  There is no intra-or extrahepatic biliary ductal dilatation.  Gallbladder has been removed.  The stomach, pancreas, spleen, and adrenal glands are unremarkable.    Kidneys enhance normally with no evidence of hydronephrosis.  No significant abnormalities are seen along the ureteral courses.  Urinary bladder and uterus are unremarkable.    Appendix is visualized and is unremarkable.  Scattered colonic diverticula are seen.  There is at least partial developing small bowel obstruction seen secondary to lower anterior abdominal wall hernia containing loop of small bowel.  Small bowel feces sign is seen in this region.  Prominent fluid-filled small bowel loops are seen proximal to this region with small air-fluid levels seen.  No free air or free fluid.    Aorta tapers normally.    No acute osseous abnormality identified.                                  Medical Decision Making:   Initial Assessment:    This is a 54-year-old female history of morbid obesity, hypertension, diabetes, coronary artery disease with MI, ventral hernia who presents the ER for evaluation  Of abdominal pain.  Onset today at 2:00 a.m., progressively worsening.  Took 5 aspirin with no improvement.  Reports pain is worsening which prompted to come to the ER.  No fever, chills, chest pain or shortness of breath.  She does have a large distended obese abdomen, with tenderness along her ventral hernia without  skin discoloration over it. Differential includes small versus large bowel obstruction, incarcerated versus strangulated hernia, constipation, appendicitis, versus other cause.  Will obtain blood work pain control CT scan imaging will reassess.                   ED Course as of Jun 22 0509   Mon Jun 22, 2020   0142  Resting in bed, no distress.  CT concerning for small-bowel obstruction.  Discussed with patient surgeon Dr. Barboza who  saw patient during her last admission.   Accepted patient.  Will admit to his service.  Will place bridging orders.  Will place NG tube.    [SE]   0313   Patient declining NG tube. Understands risks.     [SE]      ED Course User Index  [SE] David Chaparro MD                Clinical Impression:       ICD-10-CM ICD-9-CM   1. Ventral hernia with obstruction and without gangrene  K43.6 552.20   2. Abdominal pain  R10.9 789.00   3. Nausea  R11.0 787.02   4. Small bowel obstruction  K56.609 560.9         Disposition:   Disposition: Admitted  Condition: Fair     ED Disposition Condition    Admit              My Scribe Attestation: I acknowledge that the documentation on this chart was provided by described on the date of service noted above and that the documentation in the chart accurately reflects work and decisions made by me alone.               David Chaparro MD  06/22/20 050

## 2020-06-22 NOTE — H&P
[facility]  General Surgery  History & Physical    Patient Name: Reina Edwards  MRN: [id number]  Admission Date: 6/21/2020  Attending Physician: Brady Barboza MD   Primary Care Provider: Primary Doctor No    Patient information was obtained from patient, past medical records and ER records.     Subjective:     Chief Complaint/Reason for Admission: Incarcerated ventral hernia with small bowel obstruction    History of Present Illness:  Reina Edwards is a 54 y.o. female w hx of morbid obesity (BMI 56), HTN, HLD, recent hx of MI (March 2020, prescribed but not currently taking ASA or plavix), and an incarcerated ventral hernia that has been present for about one year. She was previously admitted at the beginning of May and non-operatively treated for a small bowel obstruction at the site of the ventral hernia. She was discharged with the plan to follow up with Cardiology as an outpatient and be further optimized prior to ventral hernia repair. Patient re-presented to the [facility] on 6/21 with a one day history of nausea, vomiting, and abdominal pain. Labwork and vital signs were within normal limits, but CT scan demonstrated a partial SBO at the site of the ventral hernia with fecalization of the small bowel. No signs of perforation or bowel ischemia. She firmly declined placement of NG tube on arrival.  She was admitted to General Surgery for management of her SBO.     No current facility-administered medications on file prior to encounter.      Current Outpatient Medications on File Prior to Encounter   Medication Sig    amLODIPine (NORVASC) 10 MG tablet Take 10 mg by mouth once daily.    atorvastatin (LIPITOR) 40 MG tablet Take 1 tablet (40 mg total) by mouth once daily.    hydrochlorothiazide (HYDRODIURIL) 25 MG tablet Take 1 tablet (25 mg total) by mouth once daily.    lisinopril (PRINIVIL,ZESTRIL) 40 MG tablet Take 1 tablet (40 mg total) by mouth once daily.    metformin (GLUCOPHAGE)  500 MG tablet Take 1 tablet (500 mg total) by mouth daily with breakfast.    nitroGLYCERIN (NITROSTAT) 0.4 MG SL tablet Place 1 tablet (0.4 mg total) under the tongue every 5 (five) minutes as needed for Chest pain (Take 1 after 15 minutes of chest pain, if chest pain persists, take a second pill and head to local Emergency Room).    sodium chloride (OCEAN) 0.65 % nasal spray 1 spray by Nasal route as needed for Congestion. (Patient not taking: Reported on 2020)       Review of patient's allergies indicates:  No Known Allergies    Past Medical History:   Diagnosis Date    Coronary artery disease     Diabetes mellitus     Hypertension     MI (myocardial infarction) 2020     Past Surgical History:   Procedure Laterality Date     SECTION      CHOLECYSTECTOMY       Family History     None        Tobacco Use    Smoking status: Never Smoker    Smokeless tobacco: Never Used   Substance and Sexual Activity    Alcohol use: No    Drug use: No    Sexual activity: Not Currently     Review of Systems   Constitutional: Negative for activity change, chills, fatigue and fever.   HENT: Negative for congestion and sore throat.    Respiratory: Negative for chest tightness and shortness of breath.    Cardiovascular: Negative for chest pain and palpitations.   Gastrointestinal: Positive for abdominal pain (now resolved), nausea and vomiting. Negative for abdominal distention, constipation and diarrhea.   Endocrine: Negative.    Genitourinary: Negative.    Musculoskeletal: Negative.    Skin: Negative.    Neurological: Negative for weakness and headaches.     Objective:     Vital Signs (Most Recent):  Temp: 98.3 °F (36.8 °C) (20)  Pulse: 97 (20)  Resp: (!) 22 (20)  BP: (!) 133/92 (20)  SpO2: 96 % (20 0823) Vital Signs (24h Range):  Temp:  [98.3 °F (36.8 °C)-99.6 °F (37.6 °C)] 98.3 °F (36.8 °C)  Pulse:  [88-98] 97  Resp:  [18-22] 22  SpO2:  [92 %-96 %] 96 %  BP:  (133-208)/(75-95) 133/92     Weight: 130.5 kg (287 lb 11.2 oz)  Body mass index is 56.19 kg/m².    Physical Exam  Constitutional:       Appearance: She is well-developed.   Neck:      Vascular: No JVD.      Trachea: No tracheal deviation.   Cardiovascular:      Rate and Rhythm: Normal rate and regular rhythm.   Pulmonary:      Effort: No respiratory distress.      Breath sounds: Normal breath sounds.      Comments: Room air  Abdominal:      General: There is no distension.      Palpations: Abdomen is soft. There is no mass.      Tenderness: There is no abdominal tenderness. There is no guarding or rebound.      Comments: Morbidly obese, most of weight in abdomen. BMI 57.   Palpable non-reducible ventral hernia just to the left of midline. Not firm, no overlying skin changes. Non-tender to palpation.   Not peritonitic.    Skin:     General: Skin is warm and dry.   Neurological:      Mental Status: She is alert and oriented to person, place, and time.         Significant Labs:  CBC:   Recent Labs   Lab 06/22/20  0053   WBC 10.30   RBC 4.98   HGB 13.9   HCT 44.4      MCV 89   MCH 27.9   MCHC 31.3*     CMP:   Recent Labs   Lab 06/21/20  2357   *   CALCIUM 9.7   ALBUMIN 4.0   PROT 8.2      K 3.7   CO2 29      BUN 14   CREATININE 0.8   ALKPHOS 94   ALT 25   AST 20   BILITOT 0.6     Microbiology Results (last 7 days)     ** No results found for the last 168 hours. **          Significant Diagnostics:  I have reviewed all pertinent imaging results/findings within the past 24 hours.     CT Abdomen 6/22:    COMPARISON:  CT abdomen and pelvis from 04/30/2020.     FINDINGS:  The visualized portion of the heart is unremarkable.  The lung bases are clear.     Liver is enlarged measuring 23 cm and diffusely hypoattenuating in appearance likely reflecting diffuse fatty infiltration.  There is no intra-or extrahepatic biliary ductal dilatation.  Gallbladder has been removed.  The stomach, pancreas, spleen,  and adrenal glands are unremarkable.     Kidneys enhance normally with no evidence of hydronephrosis.  No significant abnormalities are seen along the ureteral courses.  Urinary bladder and uterus are unremarkable.     Appendix is visualized and is unremarkable.  Scattered colonic diverticula are seen.  There is at least partial developing small bowel obstruction seen secondary to lower anterior abdominal wall hernia containing loop of small bowel.  Small bowel feces sign is seen in this region.  Prominent fluid-filled small bowel loops are seen proximal to this region with small air-fluid levels seen.  No free air or free fluid.     Aorta tapers normally.     No acute osseous abnormality identified.     Impression:     Small-bowel obstruction with transition point seen involving lower anterior abdominal wall ventral hernia.     Postsurgical changes and additional findings as detailed above.    Assessment/Plan:     * Ventral hernia with obstruction and without gangrene  Patient is a 55yo female with HTN, HLD, recent MI, morbid obesity (BMI 57), and known ventral hernia that presents with small bowel obstruction at the site of the hernia.     - NPO, mIVF  - Hold off on NG tube for now as no longer N/V  - Consult cardiology for pre-operative assessment in light of recent MI. We appreciate their input.  - Hold home asa and plavix  - After having discussed the risks and benefits of surgery with the patient (using HERI ), the patient wishes to proceed with ventral hernia repair. As the neck of her hernia is only about 4cm, she is at a high risk of recurrence and possible strangulation. She is aware of the risks of undergoing general anesthesia, including death or MI.   - Will plan for Robotic Ventral Hernia repair with mesh on 6/23/20, pending cardiac clearance.  - Consent obtained.      VTE Risk Mitigation (From admission, onward)         Ordered     IP VTE HIGH RISK PATIENT  Once      06/22/20 0146     Place  sequential compression device  Until discontinued      06/22/20 0146                Nicol Brantley MD  General Surgery  [facility]

## 2020-06-22 NOTE — HPI
Reina Edwards is a 54 y.o. female with morbid obesity (BMI 56), HTN, HLD, and an incarcerated ventral hernia that has been present for about one year. Patient reports that she was seen at [facility] for chest pain in March and had an EKG for what she suspects was an MI. An  was utilized for our discussion. She denies having an echocardiogram, stress test of any sort, or angiogram. She reports that she received anticoagulation while in the hospital but was not discharged on asa or Plavix. She presented to [facility] for chest pain which has not recurred.  Patient reports that she was not told to follow up with cardiology upon discharge. [facility] records are not available for review.  Patient had an appointment scheduled with Dr Cantor for preoperative clearance but did not want to pay the copay and her visit was cancelled. She was to follow up at [facility] but has not to date. Patient presented to the [facility] ER on 6/21 with a one day history of nausea, vomiting, and abdominal pain. Patient denies any chest pain, SOB, palpitations, diaphoresis, edema, ACOSTA, syncope. EKG SR without acute ischemic changes. Troponin negative. CT scan demonstrated a partial SBO at the site of the ventral hernia with fecalization of the small bowel. She is admitted to General Surgery for management of her SBO. Sx is scheduled for tomorrow. Cardiology consulted for cardiac clearance.

## 2020-06-22 NOTE — PLAN OF CARE
met with patient to complete discharge assessment. Patient was alert and oriented.  used [place] [id number], to complete assessment. Patient's daughter , Carla, was on speaker phone to assist in assessment as well. Prior to admission patient was independent. Patient is retired and does not drive. Patient lives with her daughter, Carla Norton, [phone], her son-in law and her three grand children.     Patient has no medical equipment or  services. Patient stated she does not have insurance and does struggle with some medications. SW provided patient with Patient Assistance, [phone],Bree,  for Medicaid screening and assistance with medicine cost. Patient has no additional social needs at this time.    SW provided patient with a discharge brochure and wrote contact information on the white board. Sw encouraged patient to contact if any issues or need arise. Patient verbalized understanding.      06/22/20 1511   Discharge Assessment   Assessment Type Discharge Planning Assessment   Confirmed/corrected address and phone number on facesheet? Yes   Assessment information obtained from? Patient  ([employer] , [place] [id number])   Prior to hospitilization cognitive status: Alert/Oriented   Prior to hospitalization functional status: Independent;Assistive Equipment   Current cognitive status: Alert/Oriented   Current Functional Status: Independent;Assistive Equipment   Lives With child(minal), adult;grandchild(minal)  (Carla Norton, daughter, [phone])   Is patient able to care for self after discharge? Yes   Patient's perception of discharge disposition admitted as an inpatient   Patient currently being followed by outpatient case management? No   Patient currently receives any other outside agency services? No   Equipment Currently Used at Home walker, rolling   Do you have any problems affording any of your prescribed medications? Yes  (No specifications)   Is the  patient taking medications as prescribed? yes   Does the patient have transportation home? Yes   Transportation Anticipated family or friend will provide   Discharge Plan A Home   Discharge Plan B Home with family   DME Needed Upon Discharge  none   Patient/Family in Agreement with Plan yes   Does the patient have transportation to healthcare appointments? Yes

## 2020-06-22 NOTE — PROGRESS NOTES
Patient's O2 77% on room air during non vitals, 2L NC started on patient. Respiratory team at bedside coaching patient to take deep breaths.  02 sat elevated to 95% on 2L NC  MD Barboza notified. Will continue to monitor.

## 2020-06-22 NOTE — SUBJECTIVE & OBJECTIVE
No current facility-administered medications on file prior to encounter.      Current Outpatient Medications on File Prior to Encounter   Medication Sig    amLODIPine (NORVASC) 10 MG tablet Take 10 mg by mouth once daily.    atorvastatin (LIPITOR) 40 MG tablet Take 1 tablet (40 mg total) by mouth once daily.    hydrochlorothiazide (HYDRODIURIL) 25 MG tablet Take 1 tablet (25 mg total) by mouth once daily.    lisinopril (PRINIVIL,ZESTRIL) 40 MG tablet Take 1 tablet (40 mg total) by mouth once daily.    metformin (GLUCOPHAGE) 500 MG tablet Take 1 tablet (500 mg total) by mouth daily with breakfast.    nitroGLYCERIN (NITROSTAT) 0.4 MG SL tablet Place 1 tablet (0.4 mg total) under the tongue every 5 (five) minutes as needed for Chest pain (Take 1 after 15 minutes of chest pain, if chest pain persists, take a second pill and head to local Emergency Room).    sodium chloride (OCEAN) 0.65 % nasal spray 1 spray by Nasal route as needed for Congestion. (Patient not taking: Reported on 2020)       Review of patient's allergies indicates:  No Known Allergies    Past Medical History:   Diagnosis Date    Coronary artery disease     Diabetes mellitus     Hypertension     MI (myocardial infarction) 2020     Past Surgical History:   Procedure Laterality Date     SECTION      CHOLECYSTECTOMY       Family History     None        Tobacco Use    Smoking status: Never Smoker    Smokeless tobacco: Never Used   Substance and Sexual Activity    Alcohol use: No    Drug use: No    Sexual activity: Not Currently     Review of Systems   Constitutional: Negative for activity change, chills, fatigue and fever.   HENT: Negative for congestion and sore throat.    Respiratory: Negative for chest tightness and shortness of breath.    Cardiovascular: Negative for chest pain and palpitations.   Gastrointestinal: Positive for abdominal pain (now resolved), nausea and vomiting. Negative for abdominal distention,  constipation and diarrhea.   Endocrine: Negative.    Genitourinary: Negative.    Musculoskeletal: Negative.    Skin: Negative.    Neurological: Negative for weakness and headaches.     Objective:     Vital Signs (Most Recent):  Temp: 98.3 °F (36.8 °C) (06/22/20 0419)  Pulse: 97 (06/22/20 0419)  Resp: (!) 22 (06/22/20 0419)  BP: (!) 133/92 (06/22/20 0419)  SpO2: 96 % (06/22/20 0823) Vital Signs (24h Range):  Temp:  [98.3 °F (36.8 °C)-99.6 °F (37.6 °C)] 98.3 °F (36.8 °C)  Pulse:  [88-98] 97  Resp:  [18-22] 22  SpO2:  [92 %-96 %] 96 %  BP: (133-208)/(75-95) 133/92     Weight: 130.5 kg (287 lb 11.2 oz)  Body mass index is 56.19 kg/m².    Physical Exam  Constitutional:       Appearance: She is well-developed.   Neck:      Vascular: No JVD.      Trachea: No tracheal deviation.   Cardiovascular:      Rate and Rhythm: Normal rate and regular rhythm.   Pulmonary:      Effort: No respiratory distress.      Breath sounds: Normal breath sounds.      Comments: Room air  Abdominal:      General: There is no distension.      Palpations: Abdomen is soft. There is no mass.      Tenderness: There is no abdominal tenderness. There is no guarding or rebound.      Comments: Morbidly obese, most of weight in abdomen. BMI 57.   Palpable non-reducible ventral hernia just to the left of midline. Not firm, no overlying skin changes. Non-tender to palpation.   Not peritonitic.    Skin:     General: Skin is warm and dry.   Neurological:      Mental Status: She is alert and oriented to person, place, and time.         Significant Labs:  CBC:   Recent Labs   Lab 06/22/20  0053   WBC 10.30   RBC 4.98   HGB 13.9   HCT 44.4      MCV 89   MCH 27.9   MCHC 31.3*     CMP:   Recent Labs   Lab 06/21/20  2357   *   CALCIUM 9.7   ALBUMIN 4.0   PROT 8.2      K 3.7   CO2 29      BUN 14   CREATININE 0.8   ALKPHOS 94   ALT 25   AST 20   BILITOT 0.6     Microbiology Results (last 7 days)     ** No results found for the last 168 hours.  **          Significant Diagnostics:  I have reviewed all pertinent imaging results/findings within the past 24 hours.     CT Abdomen 6/22:    COMPARISON:  CT abdomen and pelvis from 04/30/2020.     FINDINGS:  The visualized portion of the heart is unremarkable.  The lung bases are clear.     Liver is enlarged measuring 23 cm and diffusely hypoattenuating in appearance likely reflecting diffuse fatty infiltration.  There is no intra-or extrahepatic biliary ductal dilatation.  Gallbladder has been removed.  The stomach, pancreas, spleen, and adrenal glands are unremarkable.     Kidneys enhance normally with no evidence of hydronephrosis.  No significant abnormalities are seen along the ureteral courses.  Urinary bladder and uterus are unremarkable.     Appendix is visualized and is unremarkable.  Scattered colonic diverticula are seen.  There is at least partial developing small bowel obstruction seen secondary to lower anterior abdominal wall hernia containing loop of small bowel.  Small bowel feces sign is seen in this region.  Prominent fluid-filled small bowel loops are seen proximal to this region with small air-fluid levels seen.  No free air or free fluid.     Aorta tapers normally.     No acute osseous abnormality identified.     Impression:     Small-bowel obstruction with transition point seen involving lower anterior abdominal wall ventral hernia.     Postsurgical changes and additional findings as detailed above.

## 2020-06-22 NOTE — ED NOTES
Dr. Chaparro stated ok for pt to refuse NG tube. Alert pt she cannot eat or drink anything until further notice.

## 2020-06-22 NOTE — PLAN OF CARE
VIRTUAL NURSE:  Cued into patient's room per KEVIN Wolfe, bedside nurse request.  Bita  Aurora, #[id number], on screen.  Permission received per patient to turn camera to view patient.  Introduced as VN for night shift that will be working with floor nurse and nursing assistant.  Educated patient on VN's role in patient care. Plan of care reviewed with patient. Education per flowsheet.  Opportunity given for questions and questions answered.  Admission assessment questions answered.  C/o dizziness from pain medication and nausea; KEVIN Wolfe assisted patient to lay further back in bed and not on side of bed where she could fall; will admin compazine prn.  Instructed to call for assistance.  Will cont to monitor.    Labs, notes, and orders reviewed.

## 2020-06-22 NOTE — CARE UPDATE
O2 saturation 77% on room air; patient coached with deep breathing/breath holding techniques. O2 sat increased to 85%. Placed on nasal cannula 2 lpm. O2 sat increased to 95%.

## 2020-06-22 NOTE — ED NOTES
Pt lying on stretcher with eyes closed no distress noted at this time. When I tapped pt to see if she was ok pt open eyes spontaneously and states ok thank you.

## 2020-06-22 NOTE — NURSING
Notified Dr. Barboza that pt had arrived. Informed him of prn orders for pain and nausea medications. Also of pt's refusal of NGT. No new orders at this time.

## 2020-06-22 NOTE — ASSESSMENT & PLAN NOTE
[facility] records unavailable for review, please attempt to get these records  Patient reports that the only testing at  was EKGs; no stress test or angiogram was performed- reports anticoagulation while hospitalized there but not continued on DAPT upon DC. Her recollection of that hospitalization and medication list is not consistent with recent MI (no DAPT or BB) but unable to confirm without records.    EKG SR without acute ischemic changes  Will get TTE today to assess LVEF, wall motion and valves.   Patient is currently without ACS/USA, decompensated CHF, significant arrhythmias and I do not appreciate any severe murmurs on exam. METS are limited by her obesity.     If no severe valvular disease on TTE, Pt will not require further cardiac evaluation prior to undergoing surgical procedure. These recommendations follow the 2014 ACC/AHA Guideline on Perioperative Cardiovascular Evaluation and Management of Patients Undergoing Noncardiac Surgery. (JACC Vol. 64 No. 22, Dec 9, 2014, pp h73-x243).    I will follow up on TTE in AM prior to surgery

## 2020-06-22 NOTE — SUBJECTIVE & OBJECTIVE
Past Medical History:   Diagnosis Date    Coronary artery disease     Diabetes mellitus     Hypertension     MI (myocardial infarction) 2020       Past Surgical History:   Procedure Laterality Date     SECTION      CHOLECYSTECTOMY         Review of patient's allergies indicates:  No Known Allergies    No current facility-administered medications on file prior to encounter.      Current Outpatient Medications on File Prior to Encounter   Medication Sig    amLODIPine (NORVASC) 10 MG tablet Take 10 mg by mouth once daily.    atorvastatin (LIPITOR) 40 MG tablet Take 1 tablet (40 mg total) by mouth once daily.    hydrochlorothiazide (HYDRODIURIL) 25 MG tablet Take 1 tablet (25 mg total) by mouth once daily.    lisinopril (PRINIVIL,ZESTRIL) 40 MG tablet Take 1 tablet (40 mg total) by mouth once daily.    metformin (GLUCOPHAGE) 500 MG tablet Take 1 tablet (500 mg total) by mouth daily with breakfast.    nitroGLYCERIN (NITROSTAT) 0.4 MG SL tablet Place 1 tablet (0.4 mg total) under the tongue every 5 (five) minutes as needed for Chest pain (Take 1 after 15 minutes of chest pain, if chest pain persists, take a second pill and head to local Emergency Room).    sodium chloride (OCEAN) 0.65 % nasal spray 1 spray by Nasal route as needed for Congestion. (Patient not taking: Reported on 2020)     Family History     None        Tobacco Use    Smoking status: Never Smoker    Smokeless tobacco: Never Used   Substance and Sexual Activity    Alcohol use: No    Drug use: No    Sexual activity: Not Currently     Review of Systems   Constitution: Negative for diaphoresis.   HENT: Negative.    Eyes: Negative.    Cardiovascular: Negative for chest pain, dyspnea on exertion, irregular heartbeat, leg swelling, near-syncope, orthopnea, palpitations, paroxysmal nocturnal dyspnea and syncope.   Respiratory: Negative.  Negative for cough and shortness of breath.    Endocrine: Negative.    Hematologic/Lymphatic:  Negative.    Skin: Negative.    Musculoskeletal: Negative.    Gastrointestinal: Positive for abdominal pain.   Genitourinary: Negative.    Neurological: Negative.    Psychiatric/Behavioral: Negative.    Allergic/Immunologic: Negative.      Objective:     Vital Signs (Most Recent):  Temp: 97.2 °F (36.2 °C) (06/22/20 1227)  Pulse: 88 (06/22/20 1227)  Resp: (!) 22 (06/22/20 0419)  BP: 96/63 (06/22/20 1227)  SpO2: 95 % (06/22/20 1243) Vital Signs (24h Range):  Temp:  [97.2 °F (36.2 °C)-99.6 °F (37.6 °C)] 97.2 °F (36.2 °C)  Pulse:  [88-98] 88  Resp:  [18-22] 22  SpO2:  [70 %-96 %] 95 %  BP: ()/(63-95) 96/63     Weight: 130.5 kg (287 lb 11.2 oz)  Body mass index is 56.19 kg/m².    SpO2: 95 %  O2 Device (Oxygen Therapy): nasal cannula    No intake or output data in the 24 hours ending 06/22/20 1501    Lines/Drains/Airways     Peripheral Intravenous Line                 Peripheral IV - Single Lumen 06/22/20 0000 20 G Left Hand less than 1 day                Physical Exam   Constitutional: She is oriented to person, place, and time. She appears well-developed and well-nourished. No distress.   HENT:   Head: Atraumatic.   Eyes: Right eye exhibits no discharge. Left eye exhibits no discharge.   Neck: No JVD present.   Cardiovascular: Normal rate, regular rhythm and normal heart sounds. Exam reveals no gallop and no friction rub.   No murmur heard.  Pulmonary/Chest: Effort normal and breath sounds normal.   Abdominal: Soft.   Musculoskeletal:         General: No edema.   Neurological: She is alert and oriented to person, place, and time.   Skin: Skin is warm and dry. She is not diaphoretic.   Psychiatric: She has a normal mood and affect. Her behavior is normal.       Significant Labs:   BMP:   Recent Labs   Lab 06/21/20  2357   *      K 3.7      CO2 29   BUN 14   CREATININE 0.8   CALCIUM 9.7   MG 1.9   , CMP   Recent Labs   Lab 06/21/20  2357      K 3.7      CO2 29   *   BUN 14    CREATININE 0.8   CALCIUM 9.7   PROT 8.2   ALBUMIN 4.0   BILITOT 0.6   ALKPHOS 94   AST 20   ALT 25   ANIONGAP 11   ESTGFRAFRICA >60   EGFRNONAA >60   , CBC   Recent Labs   Lab 06/22/20  0053   WBC 10.30   HGB 13.9   HCT 44.4      , INR   Recent Labs   Lab 06/21/20  2357   INR 1.0   , Lipid Panel No results for input(s): CHOL, HDL, LDLCALC, TRIG, CHOLHDL in the last 48 hours., Troponin   Recent Labs   Lab 06/21/20  2357   TROPONINI 0.018    and All pertinent lab results from the last 24 hours have been reviewed.    Significant Imaging: Echocardiogram: Transthoracic echo (TTE) complete (Cupid Only): No results found for this or any previous visit.

## 2020-06-22 NOTE — ASSESSMENT & PLAN NOTE
Patient is a 55yo female with HTN, HLD, recent MI, morbid obesity (BMI 57), and known ventral hernia that presents with small bowel obstruction at the site of the hernia.     - NPO, mIVF  - Hold off on NG tube for now as no longer N/V  - Consult cardiology for pre-operative assessment in light of recent MI. We appreciate their input.  - Hold home asa and plavix  - After having discussed the risks and benefits of surgery with the patient (using HERI ), the patient wishes to proceed with ventral hernia repair. As the neck of her hernia is only about 4cm, she is at a high risk of recurrence and possible strangulation. She is aware of the risks of undergoing general anesthesia, including death or MI.   - Will plan for Robotic Ventral Hernia repair with mesh on 6/23/20, pending cardiac clearance.  - Consent obtained.

## 2020-06-22 NOTE — ANESTHESIA PREPROCEDURE EVALUATION
2020  Reina Edwards is a 54 y.o., female w pmhx of HTN/HLD, CAD/MI (3/2020 on ASA & plavix), DM2, morbid obesity (BMI 57) scheduled for robotic hernia repair 20.    Review of patient's allergies indicates:  No Known Allergies    Past Medical History:   Diagnosis Date    Coronary artery disease     Diabetes mellitus     Hypertension     MI (myocardial infarction) 2020     Past Surgical History:   Procedure Laterality Date     SECTION      CHOLECYSTECTOMY       Scheduled Medications   famotidine (PF)  20 mg Intravenous Q12H    lorazepam  2 mg Intravenous ED 1 Time       Anesthesia Evaluation    I have reviewed the Patient Summary Reports.    I have reviewed the Nursing Notes.    I have reviewed the Medications.     Review of Systems  Anesthesia Hx:  No problems with previous Anesthesia Denies Hx of Anesthetic complications   Denies Personal Hx of Anesthesia complications.   Social:  Non-Smoker, No Alcohol Use    Hematology/Oncology:  Hematology Normal        Cardiovascular:   Hypertension Past MI ( 3/2020) CAD      Pulmonary:  Pulmonary Normal    Renal/:  Renal/ Normal     Hepatic/GI:   Ventral hernia   H/o SBO   Neurological:  Neurology Normal    Endocrine:   Diabetes        Physical Exam  General:  Morbid Obesity    Airway/Jaw/Neck:  Airway Findings: Mouth Opening: Normal Tongue: Normal  General Airway Assessment: Adult  Mallampati: III  TM Distance: Normal, at least 6 cm  Jaw/Neck Findings:  Neck ROM: Normal ROM  Neck Findings:  Girth Increased      Dental:  Dental Findings:Removable front tooth   Chest/Lungs:  Chest/Lungs Clear    Heart/Vascular:  Heart Findings: Normal       Mental Status:  Mental Status Findings:  Cooperative, Alert and Oriented         Anesthesia Plan  Type of Anesthesia, risks & benefits discussed:  Anesthesia Type:  general  Patient's Preference:    Intra-op Monitoring Plan: standard ASA monitors  Intra-op Monitoring Plan Comments:   Post Op Pain Control Plan: multimodal analgesia  Post Op Pain Control Plan Comments:   Induction:   IV  Beta Blocker:         Informed Consent: Patient understands risks and agrees with Anesthesia plan.  Questions answered. Anesthesia consent signed with patient.  ASA Score: 3     Day of Surgery Review of History & Physical:    H&P update referred to the surgeon.     Anesthesia Plan Notes: Czech speaking. Consent obtained with [facility] .        Ready For Surgery From Anesthesia Perspective.     Lab Results   Component Value Date    WBC 10.30 06/22/2020    HGB 13.9 06/22/2020    HCT 44.4 06/22/2020     06/22/2020    ALT 25 06/21/2020    AST 20 06/21/2020     06/21/2020    K 3.7 06/21/2020     06/21/2020    CREATININE 0.8 06/21/2020    BUN 14 06/21/2020    CO2 29 06/21/2020    TSH 3.848 06/21/2020    INR 1.0 06/21/2020

## 2020-06-22 NOTE — CONSULTS
[facility]  Cardiology  Consult Note    Patient Name: Reina Edwards  MRN: [id number]  Admission Date: 6/21/2020  Hospital Length of Stay: 0 days  Code Status: Full Code   Attending Provider: Brady Barboza MD   Consulting Provider: Ramon Lopez NP  Primary Care Physician: Primary Doctor No  Principal Problem:Ventral hernia with obstruction and without gangrene    Patient information was obtained from patient, past medical records and ER records.     Inpatient consult to [facility]  Consult performed by: Ramon Lopez NP  Consult ordered by: Brady Barboza MD        Subjective:     Chief Complaint:  Abdominal pain      HPI:   Reina Edwards is a 54 y.o. female with morbid obesity (BMI 56), HTN, HLD, and an incarcerated ventral hernia that has been present for about one year. Patient reports that she was seen at [facility] for chest pain in March and had an EKG for what she suspects was an MI. An  was utilized for our discussion. She denies having an echocardiogram, stress test of any sort, or angiogram. She reports that she received anticoagulation while in the hospital but was not discharged on asa or Plavix. She presented to [facility] for chest pain which has not recurred.  Patient reports that she was not told to follow up with cardiology upon discharge. [facility] records are not available for review.  Patient had an appointment scheduled with Dr Cantor for preoperative clearance but did not want to pay the copay and her visit was cancelled. She was to follow up at [facility] but has not to date. Patient presented to the [facility] ER on 6/21 with a one day history of nausea, vomiting, and abdominal pain. Patient denies any chest pain, SOB, palpitations, diaphoresis, edema, ACOSTA, syncope. EKG SR without acute ischemic changes. Troponin negative. CT scan demonstrated a partial SBO at the site of the ventral hernia with fecalization of the small bowel. She is admitted to General Surgery  for management of her SBO. Sx is scheduled for tomorrow. Cardiology consulted for cardiac clearance.        Past Medical History:   Diagnosis Date    Coronary artery disease     Diabetes mellitus     Hypertension     MI (myocardial infarction) 2020       Past Surgical History:   Procedure Laterality Date     SECTION      CHOLECYSTECTOMY         Review of patient's allergies indicates:  No Known Allergies    No current facility-administered medications on file prior to encounter.      Current Outpatient Medications on File Prior to Encounter   Medication Sig    amLODIPine (NORVASC) 10 MG tablet Take 10 mg by mouth once daily.    atorvastatin (LIPITOR) 40 MG tablet Take 1 tablet (40 mg total) by mouth once daily.    hydrochlorothiazide (HYDRODIURIL) 25 MG tablet Take 1 tablet (25 mg total) by mouth once daily.    lisinopril (PRINIVIL,ZESTRIL) 40 MG tablet Take 1 tablet (40 mg total) by mouth once daily.    metformin (GLUCOPHAGE) 500 MG tablet Take 1 tablet (500 mg total) by mouth daily with breakfast.    nitroGLYCERIN (NITROSTAT) 0.4 MG SL tablet Place 1 tablet (0.4 mg total) under the tongue every 5 (five) minutes as needed for Chest pain (Take 1 after 15 minutes of chest pain, if chest pain persists, take a second pill and head to local Emergency Room).    sodium chloride (OCEAN) 0.65 % nasal spray 1 spray by Nasal route as needed for Congestion. (Patient not taking: Reported on 2020)     Family History     None        Tobacco Use    Smoking status: Never Smoker    Smokeless tobacco: Never Used   Substance and Sexual Activity    Alcohol use: No    Drug use: No    Sexual activity: Not Currently     Review of Systems   Constitution: Negative for diaphoresis.   HENT: Negative.    Eyes: Negative.    Cardiovascular: Negative for chest pain, dyspnea on exertion, irregular heartbeat, leg swelling, near-syncope, orthopnea, palpitations, paroxysmal nocturnal dyspnea and syncope.    Respiratory: Negative.  Negative for cough and shortness of breath.    Endocrine: Negative.    Hematologic/Lymphatic: Negative.    Skin: Negative.    Musculoskeletal: Negative.    Gastrointestinal: Positive for abdominal pain.   Genitourinary: Negative.    Neurological: Negative.    Psychiatric/Behavioral: Negative.    Allergic/Immunologic: Negative.      Objective:     Vital Signs (Most Recent):  Temp: 97.2 °F (36.2 °C) (06/22/20 1227)  Pulse: 88 (06/22/20 1227)  Resp: (!) 22 (06/22/20 0419)  BP: 96/63 (06/22/20 1227)  SpO2: 95 % (06/22/20 1243) Vital Signs (24h Range):  Temp:  [97.2 °F (36.2 °C)-99.6 °F (37.6 °C)] 97.2 °F (36.2 °C)  Pulse:  [88-98] 88  Resp:  [18-22] 22  SpO2:  [70 %-96 %] 95 %  BP: ()/(63-95) 96/63     Weight: 130.5 kg (287 lb 11.2 oz)  Body mass index is 56.19 kg/m².    SpO2: 95 %  O2 Device (Oxygen Therapy): nasal cannula    No intake or output data in the 24 hours ending 06/22/20 1501    Lines/Drains/Airways     Peripheral Intravenous Line                 Peripheral IV - Single Lumen 06/22/20 0000 20 G Left Hand less than 1 day                Physical Exam   Constitutional: She is oriented to person, place, and time. She appears well-developed and well-nourished. No distress.   HENT:   Head: Atraumatic.   Eyes: Right eye exhibits no discharge. Left eye exhibits no discharge.   Neck: No JVD present.   Cardiovascular: Normal rate, regular rhythm and normal heart sounds. Exam reveals no gallop and no friction rub.   No murmur heard.  Pulmonary/Chest: Effort normal and breath sounds normal.   Abdominal: Soft.   Musculoskeletal:         General: No edema.   Neurological: She is alert and oriented to person, place, and time.   Skin: Skin is warm and dry. She is not diaphoretic.   Psychiatric: She has a normal mood and affect. Her behavior is normal.       Significant Labs:   BMP:   Recent Labs   Lab 06/21/20  2357   *      K 3.7      CO2 29   BUN 14   CREATININE 0.8    CALCIUM 9.7   MG 1.9   , CMP   Recent Labs   Lab 06/21/20  2357      K 3.7      CO2 29   *   BUN 14   CREATININE 0.8   CALCIUM 9.7   PROT 8.2   ALBUMIN 4.0   BILITOT 0.6   ALKPHOS 94   AST 20   ALT 25   ANIONGAP 11   ESTGFRAFRICA >60   EGFRNONAA >60   , CBC   Recent Labs   Lab 06/22/20  0053   WBC 10.30   HGB 13.9   HCT 44.4      , INR   Recent Labs   Lab 06/21/20  2357   INR 1.0   , Lipid Panel No results for input(s): CHOL, HDL, LDLCALC, TRIG, CHOLHDL in the last 48 hours., Troponin   Recent Labs   Lab 06/21/20  2357   TROPONINI 0.018    and All pertinent lab results from the last 24 hours have been reviewed.    Significant Imaging: Echocardiogram: Transthoracic echo (TTE) complete (Cupid Only): No results found for this or any previous visit.    Assessment and Plan:     History of MI (myocardial infarction)  [facility] records unavailable for review, please attempt to get these records  Patient reports that the only testing at  was EKGs; no stress test or angiogram was performed- reports anticoagulation while hospitalized there but not continued on DAPT upon DC. Her recollection of that hospitalization and medication list is not consistent with recent MI (no DAPT or BB) but unable to confirm without records.    EKG SR without acute ischemic changes  Will get TTE today to assess LVEF, wall motion and valves.   Patient is currently without ACS/USA, decompensated CHF, significant arrhythmias and I do not appreciate any severe murmurs on exam. METS are limited by her obesity.     If no severe valvular disease on TTE, Pt will not require further cardiac evaluation prior to undergoing surgical procedure. These recommendations follow the 2014 ACC/AHA Guideline on Perioperative Cardiovascular Evaluation and Management of Patients Undergoing Noncardiac Surgery. (JACC Vol. 64 No. 22, Dec 9, 2014, pp m10-h222).    I will follow up on TTE in AM prior to surgery             Hyperlipidemia  Resume  statin postop     Morbid obesity with BMI of 50.0-59.9, adult  Weight loss encouraged    Essential hypertension  SBP 90s-130s  Home antihypertensives on hold- NPO         VTE Risk Mitigation (From admission, onward)         Ordered     IP VTE HIGH RISK PATIENT  Once      06/22/20 0146     Place sequential compression device  Until discontinued      06/22/20 0146                Thank you for your consult. I will follow-up with patient. Please contact us if you have any additional questions.    Ramon Lopez, OMAR  Cardiology   [facility]

## 2020-06-22 NOTE — PLAN OF CARE
Patient AAOx4, VSS, patient tolerating activity, up to chair. Free from falls. Patient remains NPO. Patient denies any pain. IV fluids continuing. Call bell in reach. Safety maintained. Will continue to monitor.   Problem: Adult Inpatient Plan of Care  Goal: Plan of Care Review  Outcome: Ongoing, Progressing  Goal: Patient-Specific Goal (Individualization)  Outcome: Ongoing, Progressing  Goal: Absence of Hospital-Acquired Illness or Injury  Outcome: Ongoing, Progressing  Goal: Optimal Comfort and Wellbeing  Outcome: Ongoing, Progressing  Goal: Readiness for Transition of Care  Outcome: Ongoing, Progressing  Goal: Rounds/Family Conference  Outcome: Ongoing, Progressing     Problem: Fall Injury Risk  Goal: Absence of Fall and Fall-Related Injury  Outcome: Ongoing, Progressing     Problem: Diabetes Comorbidity  Goal: Blood Glucose Level Within Desired Range  Outcome: Ongoing, Progressing     Problem: Hypertension Comorbidity  Goal: Blood Pressure in Desired Range  Outcome: Ongoing, Progressing     Problem: Infection (Intestinal Obstruction)  Goal: Absence of Infection Signs/Symptoms  Outcome: Ongoing, Progressing     Problem: Pain (Intestinal Obstruction)  Goal: Acceptable Pain Control  Outcome: Ongoing, Progressing     Problem: Nausea and Vomiting (Intestinal Obstruction)  Goal: Nausea and Vomiting Relief  Outcome: Ongoing, Progressing

## 2020-06-23 ENCOUNTER — ANESTHESIA (OUTPATIENT)
Dept: SURGERY | Facility: HOSPITAL | Age: 55
DRG: 354 | End: 2020-06-23

## 2020-06-23 LAB
ALLENS TEST: ABNORMAL
ANION GAP SERPL CALC-SCNC: 9 MMOL/L (ref 8–16)
BASOPHILS # BLD AUTO: 0.04 K/UL (ref 0–0.2)
BASOPHILS NFR BLD: 0.4 % (ref 0–1.9)
BUN SERPL-MCNC: 17 MG/DL (ref 6–20)
CALCIUM SERPL-MCNC: 8.8 MG/DL (ref 8.7–10.5)
CHLORIDE SERPL-SCNC: 102 MMOL/L (ref 95–110)
CO2 SERPL-SCNC: 33 MMOL/L (ref 23–29)
CREAT SERPL-MCNC: 0.7 MG/DL (ref 0.5–1.4)
DELSYS: ABNORMAL
DIFFERENTIAL METHOD: ABNORMAL
EOSINOPHIL # BLD AUTO: 0 K/UL (ref 0–0.5)
EOSINOPHIL NFR BLD: 0.1 % (ref 0–8)
ERYTHROCYTE [DISTWIDTH] IN BLOOD BY AUTOMATED COUNT: 15.9 % (ref 11.5–14.5)
ERYTHROCYTE [SEDIMENTATION RATE] IN BLOOD BY WESTERGREN METHOD: 18 MM/H
EST. GFR  (AFRICAN AMERICAN): >60 ML/MIN/1.73 M^2
EST. GFR  (NON AFRICAN AMERICAN): >60 ML/MIN/1.73 M^2
FIO2: 50
GLUCOSE SERPL-MCNC: 138 MG/DL (ref 70–110)
HCO3 UR-SCNC: 31.4 MMOL/L (ref 24–28)
HCT VFR BLD AUTO: 41.5 % (ref 37–48.5)
HGB BLD-MCNC: 12.3 G/DL (ref 12–16)
IMM GRANULOCYTES # BLD AUTO: 0.2 K/UL (ref 0–0.04)
IMM GRANULOCYTES NFR BLD AUTO: 2.2 % (ref 0–0.5)
LYMPHOCYTES # BLD AUTO: 0.9 K/UL (ref 1–4.8)
LYMPHOCYTES NFR BLD: 9.8 % (ref 18–48)
MAGNESIUM SERPL-MCNC: 2.1 MG/DL (ref 1.6–2.6)
MCH RBC QN AUTO: 28 PG (ref 27–31)
MCHC RBC AUTO-ENTMCNC: 29.6 G/DL (ref 32–36)
MCV RBC AUTO: 94 FL (ref 82–98)
MODE: ABNORMAL
MONOCYTES # BLD AUTO: 0.3 K/UL (ref 0.3–1)
MONOCYTES NFR BLD: 3.8 % (ref 4–15)
NEUTROPHILS # BLD AUTO: 7.5 K/UL (ref 1.8–7.7)
NEUTROPHILS NFR BLD: 83.7 % (ref 38–73)
NRBC BLD-RTO: 0 /100 WBC
PCO2 BLDA: 49.3 MMHG (ref 35–45)
PEEP: 5
PH SMN: 7.41 [PH] (ref 7.35–7.45)
PHOSPHATE SERPL-MCNC: 4.1 MG/DL (ref 2.7–4.5)
PLATELET # BLD AUTO: 255 K/UL (ref 150–350)
PMV BLD AUTO: 9.8 FL (ref 9.2–12.9)
PO2 BLDA: 64 MMHG (ref 80–100)
POC BE: 7 MMOL/L
POC SATURATED O2: 92 % (ref 95–100)
POC TCO2: 33 MMOL/L (ref 23–27)
POCT GLUCOSE: 128 MG/DL (ref 70–110)
POTASSIUM SERPL-SCNC: 4 MMOL/L (ref 3.5–5.1)
RBC # BLD AUTO: 4.4 M/UL (ref 4–5.4)
SAMPLE: ABNORMAL
SITE: ABNORMAL
SODIUM SERPL-SCNC: 144 MMOL/L (ref 136–145)
VT: 450
WBC # BLD AUTO: 9.01 K/UL (ref 3.9–12.7)

## 2020-06-23 PROCEDURE — 37000009 HC ANESTHESIA EA ADD 15 MINS: Performed by: STUDENT IN AN ORGANIZED HEALTH CARE EDUCATION/TRAINING PROGRAM

## 2020-06-23 PROCEDURE — 71000039 HC RECOVERY, EACH ADD'L HOUR: Performed by: STUDENT IN AN ORGANIZED HEALTH CARE EDUCATION/TRAINING PROGRAM

## 2020-06-23 PROCEDURE — 99900035 HC TECH TIME PER 15 MIN (STAT)

## 2020-06-23 PROCEDURE — 36000710: Performed by: STUDENT IN AN ORGANIZED HEALTH CARE EDUCATION/TRAINING PROGRAM

## 2020-06-23 PROCEDURE — 25000003 PHARM REV CODE 250: Performed by: NURSE ANESTHETIST, CERTIFIED REGISTERED

## 2020-06-23 PROCEDURE — 20000000 HC ICU ROOM

## 2020-06-23 PROCEDURE — 25000242 PHARM REV CODE 250 ALT 637 W/ HCPCS: Performed by: NURSE ANESTHETIST, CERTIFIED REGISTERED

## 2020-06-23 PROCEDURE — 94002 VENT MGMT INPAT INIT DAY: CPT

## 2020-06-23 PROCEDURE — 63600175 PHARM REV CODE 636 W HCPCS: Performed by: ANESTHESIOLOGY

## 2020-06-23 PROCEDURE — 80048 BASIC METABOLIC PNL TOTAL CA: CPT

## 2020-06-23 PROCEDURE — 27000221 HC OXYGEN, UP TO 24 HOURS

## 2020-06-23 PROCEDURE — C1781 MESH (IMPLANTABLE): HCPCS | Performed by: STUDENT IN AN ORGANIZED HEALTH CARE EDUCATION/TRAINING PROGRAM

## 2020-06-23 PROCEDURE — S0028 INJECTION, FAMOTIDINE, 20 MG: HCPCS | Performed by: EMERGENCY MEDICINE

## 2020-06-23 PROCEDURE — 94761 N-INVAS EAR/PLS OXIMETRY MLT: CPT

## 2020-06-23 PROCEDURE — 63600175 PHARM REV CODE 636 W HCPCS: Performed by: STUDENT IN AN ORGANIZED HEALTH CARE EDUCATION/TRAINING PROGRAM

## 2020-06-23 PROCEDURE — 63600175 PHARM REV CODE 636 W HCPCS: Performed by: EMERGENCY MEDICINE

## 2020-06-23 PROCEDURE — 99232 PR SUBSEQUENT HOSPITAL CARE,LEVL II: ICD-10-PCS | Mod: ,,, | Performed by: STUDENT IN AN ORGANIZED HEALTH CARE EDUCATION/TRAINING PROGRAM

## 2020-06-23 PROCEDURE — 63600175 PHARM REV CODE 636 W HCPCS: Performed by: NURSE ANESTHETIST, CERTIFIED REGISTERED

## 2020-06-23 PROCEDURE — 49655 PR LAP, INCISIONAL HERNIA REPAIR,INCARCERATED: CPT | Mod: ,,, | Performed by: STUDENT IN AN ORGANIZED HEALTH CARE EDUCATION/TRAINING PROGRAM

## 2020-06-23 PROCEDURE — 25000003 PHARM REV CODE 250: Performed by: EMERGENCY MEDICINE

## 2020-06-23 PROCEDURE — 84100 ASSAY OF PHOSPHORUS: CPT

## 2020-06-23 PROCEDURE — 37000008 HC ANESTHESIA 1ST 15 MINUTES: Performed by: STUDENT IN AN ORGANIZED HEALTH CARE EDUCATION/TRAINING PROGRAM

## 2020-06-23 PROCEDURE — 49655 PR LAP, INCISIONAL HERNIA REPAIR,INCARCERATED: ICD-10-PCS | Mod: ,,, | Performed by: STUDENT IN AN ORGANIZED HEALTH CARE EDUCATION/TRAINING PROGRAM

## 2020-06-23 PROCEDURE — 36415 COLL VENOUS BLD VENIPUNCTURE: CPT

## 2020-06-23 PROCEDURE — 85025 COMPLETE CBC W/AUTO DIFF WBC: CPT

## 2020-06-23 PROCEDURE — 99232 SBSQ HOSP IP/OBS MODERATE 35: CPT | Mod: ,,, | Performed by: STUDENT IN AN ORGANIZED HEALTH CARE EDUCATION/TRAINING PROGRAM

## 2020-06-23 PROCEDURE — 83735 ASSAY OF MAGNESIUM: CPT

## 2020-06-23 PROCEDURE — 71000033 HC RECOVERY, INTIAL HOUR: Performed by: STUDENT IN AN ORGANIZED HEALTH CARE EDUCATION/TRAINING PROGRAM

## 2020-06-23 PROCEDURE — 36000711: Performed by: STUDENT IN AN ORGANIZED HEALTH CARE EDUCATION/TRAINING PROGRAM

## 2020-06-23 DEVICE — MESH SYMBOTEX COMP 15X10CM: Type: IMPLANTABLE DEVICE | Site: ABDOMEN | Status: FUNCTIONAL

## 2020-06-23 RX ORDER — KETOROLAC TROMETHAMINE 30 MG/ML
INJECTION, SOLUTION INTRAMUSCULAR; INTRAVENOUS
Status: DISCONTINUED | OUTPATIENT
Start: 2020-06-23 | End: 2020-06-23

## 2020-06-23 RX ORDER — ONDANSETRON 2 MG/ML
INJECTION INTRAMUSCULAR; INTRAVENOUS
Status: DISCONTINUED | OUTPATIENT
Start: 2020-06-23 | End: 2020-06-23

## 2020-06-23 RX ORDER — HYDROCHLOROTHIAZIDE 25 MG/1
25 TABLET ORAL DAILY
Status: DISCONTINUED | OUTPATIENT
Start: 2020-06-24 | End: 2020-06-26 | Stop reason: HOSPADM

## 2020-06-23 RX ORDER — AMLODIPINE BESYLATE 5 MG/1
10 TABLET ORAL DAILY
Status: DISCONTINUED | OUTPATIENT
Start: 2020-06-24 | End: 2020-06-26 | Stop reason: HOSPADM

## 2020-06-23 RX ORDER — BUPIVACAINE HYDROCHLORIDE 5 MG/ML
INJECTION, SOLUTION PERINEURAL
Status: DISCONTINUED | OUTPATIENT
Start: 2020-06-23 | End: 2020-06-23 | Stop reason: HOSPADM

## 2020-06-23 RX ORDER — ACETAMINOPHEN 10 MG/ML
INJECTION, SOLUTION INTRAVENOUS
Status: DISCONTINUED | OUTPATIENT
Start: 2020-06-23 | End: 2020-06-23

## 2020-06-23 RX ORDER — MIDAZOLAM HYDROCHLORIDE 1 MG/ML
INJECTION, SOLUTION INTRAMUSCULAR; INTRAVENOUS
Status: DISCONTINUED | OUTPATIENT
Start: 2020-06-23 | End: 2020-06-23

## 2020-06-23 RX ORDER — SUCCINYLCHOLINE CHLORIDE 20 MG/ML
INJECTION INTRAMUSCULAR; INTRAVENOUS
Status: DISCONTINUED | OUTPATIENT
Start: 2020-06-23 | End: 2020-06-23

## 2020-06-23 RX ORDER — NEOSTIGMINE METHYLSULFATE 1 MG/ML
INJECTION, SOLUTION INTRAVENOUS
Status: DISCONTINUED | OUTPATIENT
Start: 2020-06-23 | End: 2020-06-23

## 2020-06-23 RX ORDER — OXYCODONE HYDROCHLORIDE 5 MG/1
5 TABLET ORAL
Status: DISCONTINUED | OUTPATIENT
Start: 2020-06-23 | End: 2020-06-24

## 2020-06-23 RX ORDER — FENTANYL CITRATE 50 UG/ML
INJECTION, SOLUTION INTRAMUSCULAR; INTRAVENOUS
Status: DISCONTINUED | OUTPATIENT
Start: 2020-06-23 | End: 2020-06-23

## 2020-06-23 RX ORDER — ROCURONIUM BROMIDE 10 MG/ML
INJECTION, SOLUTION INTRAVENOUS
Status: DISCONTINUED | OUTPATIENT
Start: 2020-06-23 | End: 2020-06-23

## 2020-06-23 RX ORDER — ONDANSETRON 2 MG/ML
4 INJECTION INTRAMUSCULAR; INTRAVENOUS DAILY PRN
Status: DISCONTINUED | OUTPATIENT
Start: 2020-06-23 | End: 2020-06-24

## 2020-06-23 RX ORDER — EPHEDRINE SULFATE 50 MG/ML
INJECTION, SOLUTION INTRAVENOUS
Status: DISCONTINUED | OUTPATIENT
Start: 2020-06-23 | End: 2020-06-23

## 2020-06-23 RX ORDER — LIDOCAINE HYDROCHLORIDE 20 MG/ML
INJECTION INTRAVENOUS
Status: DISCONTINUED | OUTPATIENT
Start: 2020-06-23 | End: 2020-06-23

## 2020-06-23 RX ORDER — ATORVASTATIN CALCIUM 40 MG/1
40 TABLET, FILM COATED ORAL DAILY
Status: DISCONTINUED | OUTPATIENT
Start: 2020-06-24 | End: 2020-06-26 | Stop reason: HOSPADM

## 2020-06-23 RX ORDER — CEFAZOLIN SODIUM 1 G/3ML
INJECTION, POWDER, FOR SOLUTION INTRAMUSCULAR; INTRAVENOUS
Status: DISCONTINUED | OUTPATIENT
Start: 2020-06-23 | End: 2020-06-23

## 2020-06-23 RX ORDER — ALBUTEROL SULFATE 90 UG/1
AEROSOL, METERED RESPIRATORY (INHALATION)
Status: DISCONTINUED | OUTPATIENT
Start: 2020-06-23 | End: 2020-06-23

## 2020-06-23 RX ORDER — PROPOFOL 10 MG/ML
VIAL (ML) INTRAVENOUS
Status: DISCONTINUED | OUTPATIENT
Start: 2020-06-23 | End: 2020-06-23

## 2020-06-23 RX ORDER — HYDROMORPHONE HYDROCHLORIDE 2 MG/ML
0.5 INJECTION, SOLUTION INTRAMUSCULAR; INTRAVENOUS; SUBCUTANEOUS EVERY 5 MIN PRN
Status: ACTIVE | OUTPATIENT
Start: 2020-06-23 | End: 2020-06-23

## 2020-06-23 RX ORDER — GLYCOPYRROLATE 0.2 MG/ML
INJECTION INTRAMUSCULAR; INTRAVENOUS
Status: DISCONTINUED | OUTPATIENT
Start: 2020-06-23 | End: 2020-06-23

## 2020-06-23 RX ADMIN — FENTANYL CITRATE 25 MCG: 50 INJECTION, SOLUTION INTRAMUSCULAR; INTRAVENOUS at 03:06

## 2020-06-23 RX ADMIN — PROPOFOL 5 MCG/KG/MIN: 10 INJECTION, EMULSION INTRAVENOUS at 04:06

## 2020-06-23 RX ADMIN — ROCURONIUM BROMIDE 10 MG: 10 INJECTION, SOLUTION INTRAVENOUS at 01:06

## 2020-06-23 RX ADMIN — EPHEDRINE SULFATE 10 MG: 50 INJECTION, SOLUTION INTRAMUSCULAR; INTRAVENOUS; SUBCUTANEOUS at 12:06

## 2020-06-23 RX ADMIN — FENTANYL CITRATE 200 MCG: 50 INJECTION, SOLUTION INTRAMUSCULAR; INTRAVENOUS at 10:06

## 2020-06-23 RX ADMIN — SODIUM CHLORIDE, SODIUM LACTATE, POTASSIUM CHLORIDE, AND CALCIUM CHLORIDE: .6; .31; .03; .02 INJECTION, SOLUTION INTRAVENOUS at 06:06

## 2020-06-23 RX ADMIN — CEFAZOLIN 3 G: 330 INJECTION, POWDER, FOR SOLUTION INTRAMUSCULAR; INTRAVENOUS at 10:06

## 2020-06-23 RX ADMIN — SUCCINYLCHOLINE CHLORIDE 120 MG: 20 INJECTION, SOLUTION INTRAMUSCULAR; INTRAVENOUS at 10:06

## 2020-06-23 RX ADMIN — CEFAZOLIN 3 G: 330 INJECTION, POWDER, FOR SOLUTION INTRAMUSCULAR; INTRAVENOUS at 02:06

## 2020-06-23 RX ADMIN — KETOROLAC TROMETHAMINE 30 MG: 30 INJECTION, SOLUTION INTRAMUSCULAR; INTRAVENOUS at 03:06

## 2020-06-23 RX ADMIN — ONDANSETRON 8 MG: 2 INJECTION, SOLUTION INTRAMUSCULAR; INTRAVENOUS at 03:06

## 2020-06-23 RX ADMIN — PROPOFOL 50 MCG/KG/MIN: 10 INJECTION, EMULSION INTRAVENOUS at 10:06

## 2020-06-23 RX ADMIN — NEOSTIGMINE METHYLSULFATE 5 MG: 1 INJECTION INTRAVENOUS at 03:06

## 2020-06-23 RX ADMIN — MIDAZOLAM 2 MG: 1 INJECTION INTRAMUSCULAR; INTRAVENOUS at 10:06

## 2020-06-23 RX ADMIN — LIDOCAINE HYDROCHLORIDE 75 MG: 20 INJECTION, SOLUTION INTRAVENOUS at 10:06

## 2020-06-23 RX ADMIN — FAMOTIDINE 20 MG: 10 INJECTION, SOLUTION INTRAVENOUS at 08:06

## 2020-06-23 RX ADMIN — FENTANYL CITRATE 50 MCG: 50 INJECTION, SOLUTION INTRAMUSCULAR; INTRAVENOUS at 11:06

## 2020-06-23 RX ADMIN — EPHEDRINE SULFATE 10 MG: 50 INJECTION, SOLUTION INTRAMUSCULAR; INTRAVENOUS; SUBCUTANEOUS at 11:06

## 2020-06-23 RX ADMIN — KETOROLAC TROMETHAMINE 15 MG: 30 INJECTION, SOLUTION INTRAMUSCULAR at 05:06

## 2020-06-23 RX ADMIN — KETOROLAC TROMETHAMINE 30 MG: 30 INJECTION, SOLUTION INTRAMUSCULAR; INTRAVENOUS at 12:06

## 2020-06-23 RX ADMIN — EPHEDRINE SULFATE 10 MG: 50 INJECTION, SOLUTION INTRAMUSCULAR; INTRAVENOUS; SUBCUTANEOUS at 01:06

## 2020-06-23 RX ADMIN — MORPHINE SULFATE 4 MG: 4 INJECTION INTRAVENOUS at 01:06

## 2020-06-23 RX ADMIN — FENTANYL CITRATE 50 MCG: 50 INJECTION, SOLUTION INTRAMUSCULAR; INTRAVENOUS at 01:06

## 2020-06-23 RX ADMIN — GLYCOPYRROLATE 0.8 MG: 0.2 INJECTION, SOLUTION INTRAMUSCULAR; INTRAVENOUS at 03:06

## 2020-06-23 RX ADMIN — PROPOFOL 20 MCG/KG/MIN: 10 INJECTION, EMULSION INTRAVENOUS at 07:06

## 2020-06-23 RX ADMIN — ACETAMINOPHEN 1000 MG: 10 INJECTION, SOLUTION INTRAVENOUS at 11:06

## 2020-06-23 RX ADMIN — FENTANYL CITRATE 25 MCG: 50 INJECTION, SOLUTION INTRAMUSCULAR; INTRAVENOUS at 12:06

## 2020-06-23 RX ADMIN — ALBUTEROL SULFATE 6 PUFF: 90 AEROSOL, METERED RESPIRATORY (INHALATION) at 03:06

## 2020-06-23 RX ADMIN — ROCURONIUM BROMIDE 50 MG: 10 INJECTION, SOLUTION INTRAVENOUS at 10:06

## 2020-06-23 RX ADMIN — EPHEDRINE SULFATE 10 MG: 50 INJECTION, SOLUTION INTRAMUSCULAR; INTRAVENOUS; SUBCUTANEOUS at 10:06

## 2020-06-23 RX ADMIN — PROPOFOL 100 MG: 10 INJECTION, EMULSION INTRAVENOUS at 10:06

## 2020-06-23 NOTE — CARE UPDATE
formerly [facility] Records Reviewed    Takotsubo cardiomyopathy    Proceed with surgical intervention

## 2020-06-23 NOTE — OP NOTE
DATE OF PROCEDURE:  06/23/2020    PREOPERATIVE DIAGNOSIS:  Incarcerated, obstructing Ventral incisional hernia    POSTOPERATIVE DIAGNOSIS:  Same    PROCEDURE:  Robot assisted laparoscopic repair of incarcerated incisional ventral hernia using 15cm round symbotex mesh.    SURGEON:  Brady Barboza M.D.    ASSISTANT:  Nicol Brantley PGY2    ANESTHESIA:  General endotracheal.    PREP:  Chlorhexidine.    SPECIMEN:  None    ESTIMATED BLOOD LOSS:  Minimal.    INDICATIONS:  The patient is a 54 y.o. female who presents to ED with obstruction due to   Incisional ventral hernia likely due to previous lap milton.  The patient was counseled on his options for   treatment and desired to proceed with surgical intervention.  The risks of the   procedure were described to the patient including bleeding, infection, pain,   scarring, wound complications, and recurrence.  The patient demonstrated understanding of these risks and a consent   form was obtained.    PROCEDURE:  The patient was identified in the Preoperative Unit and taken back   to the Operating Room and laid supine on the operating room table.  IV   antibiotics were administered prior to the induction of general anesthesia.    General anesthesia was induced without complication.  The patient was then   prepped and draped in standard sterile fashion manner.  A timeout procedure was   performed in accordance of hospital protocol.      A 8 mm incision was made in the right upper quadrant location using a #15 blade scalpel.  A 8mm optical trocar was used to enter the abdomen.  Once we confirmed an   intra-abdominal presence, CO2 insufflation was initiated.  A camera was inserted and the abdomen   was inspected.   At this point, an 11 mm RLQ trocar and a 8-mm right mid abdominal trocar were then placed under direct visualization.  The ventral hernia defect was identified and contained omentum and bowel. This was able to be reduced using sharp dissection. The bowel was  inspected and found to have a small area of subserosal ecchymosis likely from long term incarceration but otherwise appeared viable.  It measured about 4cm. There was also noted to be a smaller 2cm ventral hernia adjacent to that inferiorly.   The defects was closed using two 0 PDS stratafix. Upon removal of the first stratafix needle it was lost while trying to remove it through the RLQ trocar. Multiple XRs were taken and an extensive intra-abdominal search failed to locate the needle. We decided to proceed with that case and check more XRs at the end of the case.  A 15cm symobotex mesh was then selected and placed in the abdomen. It was circumferentially affixed to the  abdominal wall using 2-0 vloc PDS. The mesh was noted to be flat and it good position with good coverage of the hernia. Additional XRs focusing on each quadrant were then taken again after removal of all other needles and trocars. Needle was not seen within the abdomen. The magnet was used to try to locate the needle on the floor however we still were unable to find the needle. The subcutaneous tissues at the trocar sites was also explored with no success. Skin was closed using 4-0 monocryl and sterile dressings were applied.      The patient was awakened from anesthesia without   complication and returned to the Postop Recovery in stable condition.  At the   end of the case, sponge and needle counts were correct on 2 occasions.  I was   present and scrubbed throughout the entirety of the case.    COMPLICATIONS:  None.    CONDITION:  Stable.

## 2020-06-23 NOTE — PLAN OF CARE
pt off floor for surgery    6/23 s/p Robot assisted lap incarcerated incarcerated incisional ventral hernia      per initial assessment  --  Prior to admission patient was independent. Patient is retired and does not drive. Patient lives with her daughter, Carla Norton, [phone], her son-in law and her three grand children.      Patient has no medical equipment or  services.    Wed [place] - please try to get through to pt's pcp office for f/u apt.   f/u with surgeon:    Future Appointments   Date Time Provider Department Center   7/6/2020  9:00 AM Brady Barboza MD [facility]     pt's pcp is at [facility]        06/23/20 5220   Discharge Reassessment   Assessment Type Discharge Planning Reassessment

## 2020-06-23 NOTE — PLAN OF CARE
Problem: Adult Inpatient Plan of Care  Goal: Plan of Care Review  6/23/2020 0408 by Anthony May RN  Outcome: Ongoing, Progressing  6/23/2020 0408 by Anthony May RN  Flowsheets (Taken 6/23/2020 0408)  Plan of Care Reviewed With: patient  Goal: Patient-Specific Goal (Individualization)  Outcome: Ongoing, Progressing  Goal: Absence of Hospital-Acquired Illness or Injury  Outcome: Ongoing, Progressing  Goal: Optimal Comfort and Wellbeing  Outcome: Ongoing, Progressing  Goal: Readiness for Transition of Care  Outcome: Ongoing, Progressing  Goal: Rounds/Family Conference  Outcome: Ongoing, Progressing     Problem: Bariatric Environmental Safety  Goal: Safety Maintained with Care  Outcome: Ongoing, Progressing     Problem: Fall Injury Risk  Goal: Absence of Fall and Fall-Related Injury  Outcome: Ongoing, Progressing     Problem: Diabetes Comorbidity  Goal: Blood Glucose Level Within Desired Range  Outcome: Ongoing, Progressing     Problem: Hypertension Comorbidity  Goal: Blood Pressure in Desired Range  Outcome: Ongoing, Progressing     Problem: Fluid Deficit (Intestinal Obstruction)  Goal: Fluid Balance  Outcome: Ongoing, Progressing     Problem: Infection (Intestinal Obstruction)  Goal: Absence of Infection Signs/Symptoms  Outcome: Ongoing, Progressing     Problem: Nausea and Vomiting (Intestinal Obstruction)  Goal: Nausea and Vomiting Relief  Outcome: Ongoing, Progressing     Problem: Pain (Intestinal Obstruction)  Goal: Acceptable Pain Control  Outcome: Ongoing, Progressing     Problem: Skin Injury Risk Increased  Goal: Skin Health and Integrity  Outcome: Ongoing, Progressing   Patient is AAOX4. Pt vitals are stable. Pt received Iv fluids. Pt pain controlled with prn pain medication. Patient is NPO for procedure. Safety maintained will continue to monitor.

## 2020-06-23 NOTE — CARE UPDATE
TTE reviewed:       Severe concentric left ventricular             hypertrophy.  · Hyperdynamic left ventricular systolic function. The estimated ejection fraction is 75%.  · Grade I (mild) left ventricular diastolic dysfunction consistent with impaired relaxation.  · Normal right ventricular systolic function.  · Normal central venous pressure (3 mmHg).  · The estimated PA systolic pressure is 57 mmHg.  · Pulmonary hypertension present.     Cardiac MRI as outpatient   Uncontrolled HTN on admission with SBP >200, likely etiology of LVH.  Proceed with surgery, cardiology will follow.

## 2020-06-23 NOTE — TRANSFER OF CARE
Anesthesia Transfer of Care Note    Patient: Reina Edwards    Procedure(s) Performed: Procedure(s) (LRB):  ROBOTIC REPAIR, HERNIA, VENTRAL (N/A)    Patient location: PACU    Anesthesia Type: general    Transport from OR: Transported from OR intubated on 100% O2 by AMBU with assisted ventilation    Post pain: adequate analgesia    Post assessment: no apparent anesthetic complications and tolerated procedure well    Post vital signs: stable    Level of consciousness: awake and alert (opens eyes spontaneously, nods head)    Nausea/Vomiting: no nausea/vomiting    Complications: none    Transfer of care protocol was followed      Last vitals:   Visit Vitals  BP (!) 175/110   Pulse 84   Temp 36.9 °C (98.5 °F)   Resp 14   Ht 5' (1.524 m)   Wt 128.2 kg (282 lb 10 oz)   SpO2 (!) 93%   Breastfeeding No   BMI 55.20 kg/m²

## 2020-06-23 NOTE — PLAN OF CARE
Received pacu via stretcher,sr upx2 from OR acompained by Stacy Andrade/ [place] Rn-or. Patient intubated connected to wake up vent per Katie BULLOCK; see respiratory flow sheet for ventilator setting. Arouse to verbals,follow commands.

## 2020-06-23 NOTE — PLAN OF CARE
Report called to Stephanie Randall/ICU.   Reviewed below with Dr. Hollie Jean. Patti Martinez; recommends 24-48 hrs, per manufacturers directions below. Dr. Hollie Jean generally defers to MD performing procedure to tell patient which length to hold Eliquis, as well as, when to resume taking. Please explain to patient they are at a higher risk for stroke anytime off Eliquis. Per  directions Eliquis should be discontinued at least:  48 hrs prior to elective surgery or invasive procedures with a moderate or high risk of unacceptable or clinically significant bleeding or where the bleeding would be noncritical in location and easily controlled. 24 hrs prior to elective surgery or invasive procedures with a low risk of bleeding. Eliquis should be restarted after procedures, as soon as, adequate hemostasis has been established.

## 2020-06-24 LAB
ANION GAP SERPL CALC-SCNC: 9 MMOL/L (ref 8–16)
BASOPHILS # BLD AUTO: 0.02 K/UL (ref 0–0.2)
BASOPHILS NFR BLD: 0.3 % (ref 0–1.9)
BUN SERPL-MCNC: 17 MG/DL (ref 6–20)
CALCIUM SERPL-MCNC: 8.5 MG/DL (ref 8.7–10.5)
CHLORIDE SERPL-SCNC: 103 MMOL/L (ref 95–110)
CO2 SERPL-SCNC: 29 MMOL/L (ref 23–29)
CREAT SERPL-MCNC: 0.8 MG/DL (ref 0.5–1.4)
DIFFERENTIAL METHOD: ABNORMAL
EOSINOPHIL # BLD AUTO: 0 K/UL (ref 0–0.5)
EOSINOPHIL NFR BLD: 0.3 % (ref 0–8)
ERYTHROCYTE [DISTWIDTH] IN BLOOD BY AUTOMATED COUNT: 15.9 % (ref 11.5–14.5)
EST. GFR  (AFRICAN AMERICAN): >60 ML/MIN/1.73 M^2
EST. GFR  (NON AFRICAN AMERICAN): >60 ML/MIN/1.73 M^2
GLUCOSE SERPL-MCNC: 108 MG/DL (ref 70–110)
HCT VFR BLD AUTO: 34.2 % (ref 37–48.5)
HGB BLD-MCNC: 10.4 G/DL (ref 12–16)
IMM GRANULOCYTES # BLD AUTO: 0.04 K/UL (ref 0–0.04)
IMM GRANULOCYTES NFR BLD AUTO: 0.6 % (ref 0–0.5)
LYMPHOCYTES # BLD AUTO: 1.3 K/UL (ref 1–4.8)
LYMPHOCYTES NFR BLD: 18.9 % (ref 18–48)
MAGNESIUM SERPL-MCNC: 1.7 MG/DL (ref 1.6–2.6)
MCH RBC QN AUTO: 27.8 PG (ref 27–31)
MCHC RBC AUTO-ENTMCNC: 30.4 G/DL (ref 32–36)
MCV RBC AUTO: 91 FL (ref 82–98)
MONOCYTES # BLD AUTO: 0.5 K/UL (ref 0.3–1)
MONOCYTES NFR BLD: 7 % (ref 4–15)
NEUTROPHILS # BLD AUTO: 5.2 K/UL (ref 1.8–7.7)
NEUTROPHILS NFR BLD: 72.9 % (ref 38–73)
NRBC BLD-RTO: 0 /100 WBC
PHOSPHATE SERPL-MCNC: 3.6 MG/DL (ref 2.7–4.5)
PLATELET # BLD AUTO: 233 K/UL (ref 150–350)
PMV BLD AUTO: 10.2 FL (ref 9.2–12.9)
POCT GLUCOSE: 112 MG/DL (ref 70–110)
POCT GLUCOSE: 113 MG/DL (ref 70–110)
POCT GLUCOSE: 126 MG/DL (ref 70–110)
POCT GLUCOSE: 142 MG/DL (ref 70–110)
POTASSIUM SERPL-SCNC: 4.2 MMOL/L (ref 3.5–5.1)
RBC # BLD AUTO: 3.74 M/UL (ref 4–5.4)
SODIUM SERPL-SCNC: 141 MMOL/L (ref 136–145)
WBC # BLD AUTO: 7.1 K/UL (ref 3.9–12.7)

## 2020-06-24 PROCEDURE — 36415 COLL VENOUS BLD VENIPUNCTURE: CPT

## 2020-06-24 PROCEDURE — 25000003 PHARM REV CODE 250: Performed by: STUDENT IN AN ORGANIZED HEALTH CARE EDUCATION/TRAINING PROGRAM

## 2020-06-24 PROCEDURE — 84100 ASSAY OF PHOSPHORUS: CPT

## 2020-06-24 PROCEDURE — 85025 COMPLETE CBC W/AUTO DIFF WBC: CPT

## 2020-06-24 PROCEDURE — 11000001 HC ACUTE MED/SURG PRIVATE ROOM

## 2020-06-24 PROCEDURE — 94003 VENT MGMT INPAT SUBQ DAY: CPT

## 2020-06-24 PROCEDURE — 25000003 PHARM REV CODE 250: Performed by: ANESTHESIOLOGY

## 2020-06-24 PROCEDURE — 94761 N-INVAS EAR/PLS OXIMETRY MLT: CPT

## 2020-06-24 PROCEDURE — 83735 ASSAY OF MAGNESIUM: CPT

## 2020-06-24 PROCEDURE — 63600175 PHARM REV CODE 636 W HCPCS: Performed by: STUDENT IN AN ORGANIZED HEALTH CARE EDUCATION/TRAINING PROGRAM

## 2020-06-24 PROCEDURE — 63600175 PHARM REV CODE 636 W HCPCS: Performed by: CLINIC/CENTER

## 2020-06-24 PROCEDURE — 99900035 HC TECH TIME PER 15 MIN (STAT)

## 2020-06-24 PROCEDURE — 63600175 PHARM REV CODE 636 W HCPCS: Performed by: ANESTHESIOLOGY

## 2020-06-24 PROCEDURE — 27000221 HC OXYGEN, UP TO 24 HOURS

## 2020-06-24 PROCEDURE — 80048 BASIC METABOLIC PNL TOTAL CA: CPT

## 2020-06-24 RX ORDER — DEXMEDETOMIDINE HYDROCHLORIDE 4 UG/ML
INJECTION, SOLUTION INTRAVENOUS
Status: DISPENSED
Start: 2020-06-24 | End: 2020-06-24

## 2020-06-24 RX ORDER — ACETAMINOPHEN 325 MG/1
650 TABLET ORAL EVERY 6 HOURS PRN
Status: DISCONTINUED | OUTPATIENT
Start: 2020-06-24 | End: 2020-06-26 | Stop reason: HOSPADM

## 2020-06-24 RX ORDER — HYDRALAZINE HYDROCHLORIDE 20 MG/ML
10 INJECTION INTRAMUSCULAR; INTRAVENOUS EVERY 4 HOURS PRN
Status: DISCONTINUED | OUTPATIENT
Start: 2020-06-24 | End: 2020-06-26 | Stop reason: HOSPADM

## 2020-06-24 RX ORDER — LISINOPRIL 20 MG/1
40 TABLET ORAL DAILY
Status: DISCONTINUED | OUTPATIENT
Start: 2020-06-24 | End: 2020-06-26 | Stop reason: HOSPADM

## 2020-06-24 RX ORDER — LABETALOL HYDROCHLORIDE 5 MG/ML
10 INJECTION, SOLUTION INTRAVENOUS EVERY 6 HOURS PRN
Status: DISCONTINUED | OUTPATIENT
Start: 2020-06-24 | End: 2020-06-24

## 2020-06-24 RX ORDER — DEXMEDETOMIDINE HYDROCHLORIDE 4 UG/ML
0.2 INJECTION, SOLUTION INTRAVENOUS CONTINUOUS
Status: DISCONTINUED | OUTPATIENT
Start: 2020-06-24 | End: 2020-06-24

## 2020-06-24 RX ADMIN — LABETALOL HYDROCHLORIDE 10 MG: 5 INJECTION INTRAVENOUS at 06:06

## 2020-06-24 RX ADMIN — SODIUM CHLORIDE, SODIUM LACTATE, POTASSIUM CHLORIDE, AND CALCIUM CHLORIDE: .6; .31; .03; .02 INJECTION, SOLUTION INTRAVENOUS at 01:06

## 2020-06-24 RX ADMIN — ATORVASTATIN CALCIUM 40 MG: 40 TABLET, FILM COATED ORAL at 08:06

## 2020-06-24 RX ADMIN — OXYCODONE HYDROCHLORIDE 5 MG: 5 TABLET ORAL at 10:06

## 2020-06-24 RX ADMIN — HYDRALAZINE HYDROCHLORIDE 10 MG: 20 INJECTION INTRAMUSCULAR; INTRAVENOUS at 08:06

## 2020-06-24 RX ADMIN — PROPOFOL 50 MCG/KG/MIN: 10 INJECTION, EMULSION INTRAVENOUS at 01:06

## 2020-06-24 RX ADMIN — ACETAMINOPHEN 650 MG: 325 TABLET ORAL at 02:06

## 2020-06-24 RX ADMIN — DEXMEDETOMIDINE HYDROCHLORIDE 0.2 MCG/KG/HR: 4 INJECTION, SOLUTION INTRAVENOUS at 07:06

## 2020-06-24 RX ADMIN — LISINOPRIL 40 MG: 20 TABLET ORAL at 10:06

## 2020-06-24 RX ADMIN — AMLODIPINE BESYLATE 10 MG: 5 TABLET ORAL at 08:06

## 2020-06-24 RX ADMIN — HYDROCHLOROTHIAZIDE 25 MG: 25 TABLET ORAL at 08:06

## 2020-06-24 RX ADMIN — PROPOFOL 50 MCG/KG/MIN: 10 INJECTION, EMULSION INTRAVENOUS at 04:06

## 2020-06-24 NOTE — PLAN OF CARE
Plan of care reviewed w/pt.  Pt verbalized understanding.  Oxygen therapy maintained.  Pt instructed multiple times to leave O2 on.  Blood glucose monitored and maintained per orders.  SCDs on.  Fall/safety precautions maintained.  Bed in low position and locked.  Call light within reach.  Slip resistant socks on.  Bed alarm on.  Nurse instructed pt to notify staff for assistance.  Pt verbalized understanding.  Pt stable.  PIV clean, dry and intact.  No signs of distress noted.  Pt on tele.  No ectopy or true red alarms throughout shift.

## 2020-06-24 NOTE — PLAN OF CARE
Patient on vent with documented settings. Alarms are set and functioning with adequate volumes. Ambu bag and mask at bedside.

## 2020-06-24 NOTE — PROGRESS NOTES
[facility] ICU 5th Floor  Pulmonary/Critical Care - Medicine  History & Physical    Patient Name: Reina Edwards  MRN: [id number]  Admission Date: 2020  Hospital Length of Stay: 2 days  Code Status: Full Code  Attending Physician: Brady Barboza MD   Primary Care Provider: [facility]   Principal Problem: Ventral hernia with obstruction and without gangrene    Subjective:     Interval History:  Patient did well overnight and was successfully extubated.  No issues, pain is well under control.  Now on 2LPM via NC.       Past Medical History:   Diagnosis Date    Coronary artery disease     Diabetes mellitus     Hypertension     MI (myocardial infarction) 2020       Past Surgical History:   Procedure Laterality Date     SECTION      CHOLECYSTECTOMY      ROBOT-ASSISTED LAPAROSCOPIC REPAIR OF VENTRAL HERNIA N/A 2020    Procedure: ROBOTIC REPAIR, HERNIA, VENTRAL;  Surgeon: Brady Barboza MD;  Location: [facility];  Service: General;  Laterality: N/A;       Review of patient's allergies indicates:  No Known Allergies    Family History     None        Tobacco Use    Smoking status: Never Smoker    Smokeless tobacco: Never Used   Substance and Sexual Activity    Alcohol use: No    Drug use: No    Sexual activity: Not Currently      Review of Systems   Unable to perform ROS: Other (patient is Brazilian speaking, but states her breathing is fine and she is in no pain.)     Objective:     Vital Signs (Most Recent):  Temp: 99.1 °F (37.3 °C) (20 0700)  Pulse: 85 (20 1030)  Resp: 19 (20 1030)  BP: (!) 167/77 (20 1030)  SpO2: (!) 91 % (20 1030) Vital Signs (24h Range):  Temp:  [97.9 °F (36.6 °C)-99.3 °F (37.4 °C)] 99.1 °F (37.3 °C)  Pulse:  [] 85  Resp:  [6-33] 19  SpO2:  [77 %-100 %] 91 %  BP: (128-204)/() 167/77     Weight: 128.2 kg (282 lb 10 oz)  Body mass index is 55.2 kg/m².      Intake/Output Summary  (Last 24 hours) at 6/24/2020 1117  Last data filed at 6/24/2020 1000  Gross per 24 hour   Intake 3079.35 ml   Output 1245 ml   Net 1834.35 ml       Physical Exam  Constitutional:       General: She is not in acute distress.     Appearance: She is obese. She is not ill-appearing, toxic-appearing or diaphoretic.   HENT:      Head: Normocephalic and atraumatic.      Nose: Nose normal.      Mouth/Throat:      Mouth: Mucous membranes are moist.   Eyes:      Extraocular Movements: Extraocular movements intact.      Pupils: Pupils are equal, round, and reactive to light.   Cardiovascular:      Rate and Rhythm: Normal rate and regular rhythm.   Pulmonary:      Effort: Pulmonary effort is normal. No respiratory distress.      Breath sounds: Normal breath sounds.   Abdominal:      General: Abdomen is flat. There is no distension.      Palpations: Abdomen is soft.      Tenderness: There is no abdominal tenderness.   Musculoskeletal: Normal range of motion.         General: No swelling.   Skin:     General: Skin is warm and dry.   Neurological:      Mental Status: She is alert.         Vents:  Vent Mode: Spont (06/24/20 0807)  Set Rate: 22 BPM (06/24/20 0735)  Vt Set: 320 mL (06/24/20 0735)  Pressure Support: 5 cmH20 (06/24/20 0807)  PEEP/CPAP: 5 cmH20 (06/24/20 0807)  Oxygen Concentration (%): 40 (06/24/20 0807)  Peak Airway Pressure: 10 cmH2O (06/24/20 0807)  Total Ve: 6.59 mL (06/24/20 0807)  F/VT Ratio<105 (RSBI): (!) 31.6 (06/24/20 0807)    Lines/Drains/Airways     Drain                 Urethral Catheter 06/23/20 1800 Latex 16 Fr. less than 1 day          Airway                 Airway - Non-Surgical 06/23/20 1031 Endotracheal Tube 1 day          Peripheral Intravenous Line                 Peripheral IV - Single Lumen 06/22/20 0628 20 G Left Forearm 2 days         Peripheral IV - Single Lumen 06/23/20 1040 14 G  Right Forearm 1 day                Significant Labs:    CBC/Anemia Profile:  Recent Labs   Lab 06/23/20  0609  06/24/20  0353   WBC 9.01 7.10   HGB 12.3 10.4*   HCT 41.5 34.2*    233   MCV 94 91   RDW 15.9* 15.9*        Chemistries:  Recent Labs   Lab 06/23/20  0609 06/24/20  0353    141   K 4.0 4.2    103   CO2 33* 29   BUN 17 17   CREATININE 0.7 0.8   CALCIUM 8.8 8.5*   MG 2.1 1.7   PHOS 4.1 3.6       All pertinent labs within the past 24 hours have been reviewed.    Significant Imaging: I have reviewed all pertinent imaging results/findings within the past 24 hours.    Assessment/Plan:     Active Diagnoses:    Diagnosis Date Noted POA    PRINCIPAL PROBLEM:  Ventral hernia with obstruction and without gangrene [K43.6]  Yes    History of MI (myocardial infarction) [I25.2] 06/09/2020 Not Applicable    Hyperlipidemia [E78.5] 08/18/2016 Yes    Essential hypertension [I10] 03/16/2016 Yes    Morbid obesity with BMI of 50.0-59.9, adult [E66.01, Z68.43] 03/16/2016 Not Applicable      Problems Resolved During this Admission:       1) Inability to extubate following procedure - resolved  2) morbid obesity  3) diastolic heart failure  4) pHTN (WHO II)  5) likely volume overload s/p surgery.    - etiology of her inability to wean from vent likely secondary to obesity hypoventilation, abdominal distention s/p robotic surgery, volume overload in setting of diastolic heart failure and pHTN.  Now resolved  - recommend discontinuing IVF and transitioning to PO ASAP.    - continue tight BP control and resume home meds.  recommend PRN pushes of labetalol or hydralazine if needed.    - BIPAP at night 10/5 with target sats between 88-94%.    Thank you for the consultation.  We will sign off at this time.  Please re-consult with further questions.    Yonatan Dove MD  Critical Care - Medicine  [facility] ICU 5th Floor    Pt seen and examined with Pulmonary/Critical Care team. Critical Care time was spent validating the history and physical exam, reviewing the lab and imaging results, and discussing  the care of the patient with the bedside nurse. The following additional comments are made:    Passed SBT. We had planned to extubate to BIPAP but pt has done exceptionally well on NC.  She does have KENY and will need CPAP/BIPAP at night.    Critical Care time 30 minutes    Meliton El MD  Phone [phone]

## 2020-06-24 NOTE — RESPIRATORY THERAPY
Results for AMEYA SHANKS (MRN [id number]) as of 6/23/2020 19:45   Ref. Range 6/23/2020 19:17   POC PH Latest Ref Range: 7.35 - 7.45  7.412   POC PCO2 Latest Ref Range: 35 - 45 mmHg 49.3 (H)   POC PO2 Latest Ref Range: 80 - 100 mmHg 64 (L)   POC BE Latest Ref Range: -2 to 2 mmol/L 7   POC HCO3 Latest Ref Range: 24 - 28 mmol/L 31.4 (H)   POC SATURATED O2 Latest Ref Range: 95 - 100 % 92 (L)   POC TCO2 Latest Ref Range: 23 - 27 mmol/L 33 (H)   FiO2 Unknown 50   Vt Unknown 450   PEEP Unknown 5   Sample Unknown ARTERIAL   DelSys Unknown Adult Vent   Allens Test Unknown Pass   Site Unknown LR   Mode Unknown AC/PRVC   Rate Unknown 18     Results reported to Dr. Dove 1918. Vent changes made per MD

## 2020-06-24 NOTE — PLAN OF CARE
Pt continuously taking off her oxygen and her SpO2 keeps dropping.  JUSTO Sanchez explained to her in Lithuanian the importance of keeping the O2 on.  Pt verbalized understanding.

## 2020-06-24 NOTE — PLAN OF CARE
Patient remained in bed for the shift. Bed in lowest position, wheels locked, side rails X3 raised and call light within reach. Plan of care reviewed with patient. Nodded understanding.Sedation on propofol to be responsive to voice. VS remained stable ex HTN. ST/NSRT on telemetry.LR maintained at 125. Turned Q 2.  ABD dressing WDL. Will report to oncoming shift.

## 2020-06-24 NOTE — PLAN OF CARE
VN cued into pt's room for introduction with pt's permission. Pt transferred from ICU. Pt is Upper sorbian speaking only and VN is able to communicate with pt. Pt noted to be on oxygen and states she does not wear O2 at home. VN role explained and informed pt that VN would be working with bedside nurse and the rest of the care team. Fall risk and bed alarm protocol education provided. Instructed pt to call for assistance. Pt aware and agreeable. Allowed time for questions. Pt c/o headache and is requesting pain medicine. Pt is also requesting water. VN will notify bedside nurse. Pt also stated that dominguez catheter was removed prior to transfer. Will cont to be available as needed.

## 2020-06-24 NOTE — CONSULTS
[facility] ICU 5th Floor  Pulmonary/Critical Care - Medicine  History & Physical    Patient Name: Reina Edwards  MRN: [id number]  Admission Date: 2020  Hospital Length of Stay: 1 days  Code Status: Full Code  Attending Physician: Brady Barboza MD   Primary Care Provider: [facility]   Principal Problem: Ventral hernia with obstruction and without gangrene    Subjective:     HPI:  Ms. Edwards is a 54 year old female with past medical history of morbid obesity, ventral hernia, grade I diastolic dysfunction, pulmonary HTN (PAP 57), uncontrolled HTN, HLD, MI (2020, not taking plavix or asa even though it is prescribed), who is currently admitted to the hospital for intervention to repain an incarcerated ventral abdominal hernia.  She underwent an uncomplicated hernia repair today, and was unable to be extubated following the procedure.  She was transferred to the MICU for further monitoring while being liberated from the vent.    When seen, she is awake, but calm on the ventilator.  She is saturating 94% on 50% FiO2 and 5 of PEEP.  She is appropriate and able to assist with transfer to bed from [facility].  Her BP is well controlled on Propofol 15, and this is also placing her at an appropriate RASS.  During SBT upon arrival to MICU, she was apneic and was placed back on a rate.  Her tidal volume upon presentation was 450cc and rate of 18.  She does not indicate that she is in pain.       Past Medical History:   Diagnosis Date    Coronary artery disease     Diabetes mellitus     Hypertension     MI (myocardial infarction) 2020       Past Surgical History:   Procedure Laterality Date     SECTION      CHOLECYSTECTOMY         Review of patient's allergies indicates:  No Known Allergies    Family History     None        Tobacco Use    Smoking status: Never Smoker    Smokeless tobacco: Never Used   Substance and Sexual Activity    Alcohol use: No     Drug use: No    Sexual activity: Not Currently      Review of Systems   Unable to perform ROS: Intubated     Objective:     Vital Signs (Most Recent):  Temp: 98.6 °F (37 °C) (06/23/20 1845)  Pulse: 78 (06/23/20 1825)  Resp: 19 (06/23/20 1825)  BP: 139/68 (06/23/20 1825)  SpO2: 99 % (06/23/20 1825) Vital Signs (24h Range):  Temp:  [97.9 °F (36.6 °C)-98.6 °F (37 °C)] 98.6 °F (37 °C)  Pulse:  [66-94] 78  Resp:  [13-20] 19  SpO2:  [91 %-100 %] 99 %  BP: (114-204)/() 139/68     Weight: 128.2 kg (282 lb 10 oz)  Body mass index is 55.2 kg/m².      Intake/Output Summary (Last 24 hours) at 6/23/2020 1909  Last data filed at 6/23/2020 1825  Gross per 24 hour   Intake 1200 ml   Output 350 ml   Net 850 ml       Physical Exam  Constitutional:       General: She is not in acute distress.     Appearance: She is obese. She is not ill-appearing, toxic-appearing or diaphoretic.   HENT:      Head: Normocephalic and atraumatic.      Nose: Nose normal.      Mouth/Throat:      Mouth: Mucous membranes are moist.   Eyes:      Extraocular Movements: Extraocular movements intact.      Pupils: Pupils are equal, round, and reactive to light.   Cardiovascular:      Rate and Rhythm: Normal rate and regular rhythm.   Pulmonary:      Effort: Pulmonary effort is normal. No respiratory distress.      Breath sounds: Normal breath sounds.      Comments: Mechanically ventilated  Abdominal:      General: Abdomen is flat. There is no distension.      Palpations: Abdomen is soft.      Tenderness: There is no abdominal tenderness.   Musculoskeletal: Normal range of motion.         General: No swelling.   Skin:     General: Skin is warm and dry.   Neurological:      Mental Status: She is alert.         Vents:  Vent Mode: A/C (06/23/20 1732)  Set Rate: 18 BPM (06/23/20 1732)  Vt Set: 450 mL (06/23/20 1732)  Pressure Support: 5 cmH20 (06/23/20 1608)  PEEP/CPAP: 5 cmH20 (06/23/20 6422)  Oxygen Concentration (%): 50 (06/23/20 1825)  Peak Airway  Pressure: 22 cmH2O (06/23/20 1732)  Total Ve: 10.9 mL (06/23/20 1732)  F/VT Ratio<105 (RSBI): (!) 24.08 (06/23/20 1732)    Lines/Drains/Airways     Drain                 Urethral Catheter 06/23/20 1800 Latex 16 Fr. less than 1 day          Airway                 Airway - Non-Surgical 06/23/20 1031 Endotracheal Tube less than 1 day          Peripheral Intravenous Line                 Peripheral IV - Single Lumen 06/22/20 0628 20 G Left Forearm 1 day         Peripheral IV - Single Lumen 06/23/20 1040 14 G  Right Forearm less than 1 day                Significant Labs:    CBC/Anemia Profile:  Recent Labs   Lab 06/22/20  0053 06/23/20  0609   WBC 10.30 9.01   HGB 13.9 12.3   HCT 44.4 41.5    255   MCV 89 94   RDW 15.4* 15.9*        Chemistries:  Recent Labs   Lab 06/21/20  2357 06/23/20  0609    144   K 3.7 4.0    102   CO2 29 33*   BUN 14 17   CREATININE 0.8 0.7   CALCIUM 9.7 8.8   ALBUMIN 4.0  --    PROT 8.2  --    BILITOT 0.6  --    ALKPHOS 94  --    ALT 25  --    AST 20  --    MG 1.9 2.1   PHOS  --  4.1       All pertinent labs within the past 24 hours have been reviewed.    Significant Imaging: I have reviewed all pertinent imaging results/findings within the past 24 hours.    Assessment/Plan:     Active Diagnoses:    Diagnosis Date Noted POA    PRINCIPAL PROBLEM:  Ventral hernia with obstruction and without gangrene [K43.6]  Yes    History of MI (myocardial infarction) [I25.2] 06/09/2020 Not Applicable    Hyperlipidemia [E78.5] 08/18/2016 Yes    Essential hypertension [I10] 03/16/2016 Yes    Morbid obesity with BMI of 50.0-59.9, adult [E66.01, Z68.43] 03/16/2016 Not Applicable      Problems Resolved During this Admission:       1) Inability to extubate following procedure.  2) morbid obesity  3) diastolic heart failure  4) pHTN (WHO II)  5) likely volume overload s/p surgery.    - etiology of her inability to wean from vent likely secondary to obesity hypoventilation, abdominal distention  "s/p robotic surgery, volume overload in setting of diastolic heart failure and pHTN.    - recommend holding IVF overnight and possible diuresis trial if no better tomorrow.  continue tight BP control.  If propofol is insufficient to maintain her BP at current state, recommend PRN pushes of labetalol or hydralazine.  Resume home meds tomorrow if able.  - continue propofol at current rate as she appears very comfortable and shakes head "no" when asked if in pain.  - recommend PRN fentanyl pushes if needed.  - will allow recovery overnight and hopeful SAT/SBT in AM with extubation.  - would extubate to BIPAP.    Thank you for the consultation.  We will continue to follow along.    Yonatan Dove MD  Critical Care - Medicine  [facility] ICU 5th Floor      "

## 2020-06-24 NOTE — EICU
Rounding (Video Assessment):  No    Intervention Initiated From:  Bedside    Pato Communicated with Bedside Nurse regarding:  BP    Nurse Notified:  Yes    Doctor Notified:  Yes    Comments: bedside nurse called to get a prn b/p med for pt due to upward trend of b/p. Informed Dr. Flores.

## 2020-06-24 NOTE — PROGRESS NOTES
[facility]  General Surgery  Progress Note    Subjective:     Interval History:   Feeling well  Minimal to no pain this morning  Extubated and breathing well  Oneal in place with good UOP  Denies NV    Post-Op Info:  Procedure(s) (LRB):  ROBOTIC REPAIR, HERNIA, VENTRAL (N/A)   1 Day Post-Op      Medications:  Continuous Infusions:  Scheduled Meds:   amLODIPine  10 mg Oral Daily    atorvastatin  40 mg Oral Daily    dexMEDEtomidine in 0.9 % NaCL        hydroCHLOROthiazide  25 mg Oral Daily    lisinopriL  40 mg Oral Daily     PRN Meds:dextrose 50%, glucagon (human recombinant), hydrALAZINE, insulin aspart U-100, ketorolac, melatonin, morphine, ondansetron, prochlorperazine, sodium chloride 0.9%     Objective:     Vital Signs (Most Recent):  Temp: 99.1 °F (37.3 °C) (06/24/20 0700)  Pulse: 83 (06/24/20 1145)  Resp: 20 (06/24/20 1145)  BP: (!) 169/72 (06/24/20 1130)  SpO2: (!) 94 % (06/24/20 1145) Vital Signs (24h Range):  Temp:  [97.9 °F (36.6 °C)-99.3 °F (37.4 °C)] 99.1 °F (37.3 °C)  Pulse:  [] 83  Resp:  [6-33] 20  SpO2:  [77 %-100 %] 94 %  BP: (128-204)/() 169/72       Intake/Output Summary (Last 24 hours) at 6/24/2020 1327  Last data filed at 6/24/2020 1149  Gross per 24 hour   Intake 3079.35 ml   Output 1395 ml   Net 1684.35 ml       Physical Exam  Constitutional:       Appearance: Normal appearance. She is obese.   Cardiovascular:      Rate and Rhythm: Normal rate.   Pulmonary:      Effort: Pulmonary effort is normal.   Abdominal:      Palpations: Abdomen is soft.         Significant Labs:  CBC:   Recent Labs   Lab 06/24/20  0353   WBC 7.10   RBC 3.74*   HGB 10.4*   HCT 34.2*      MCV 91   MCH 27.8   MCHC 30.4*     CMP:   Recent Labs   Lab 06/24/20  0353      CALCIUM 8.5*      K 4.2   CO2 29      BUN 17   CREATININE 0.8       Significant Diagnostics:  I have reviewed all pertinent imaging results/findings within the past 24 hours.    Assessment/Plan:      Active Diagnoses:    Diagnosis Date Noted POA    PRINCIPAL PROBLEM:  Ventral hernia with obstruction and without gangrene [K43.6]  Yes    History of MI (myocardial infarction) [I25.2] 06/09/2020 Not Applicable    Hyperlipidemia [E78.5] 08/18/2016 Yes    Essential hypertension [I10] 03/16/2016 Yes    Morbid obesity with BMI of 50.0-59.9, adult [E66.01, Z68.43] 03/16/2016 Not Applicable      Problems Resolved During this Admission:     54F s/p robot ventral hernia repair, lost needle  Extubated without issue. To floor  Clears  Abdominal binder if out of bed  Oneal out      Brady Barboza MD  General Surgery  [facility]

## 2020-06-24 NOTE — ANESTHESIA POSTPROCEDURE EVALUATION
Anesthesia Post Evaluation    Patient: Reina Edwards    Procedure(s) Performed: Procedure(s) (LRB):  ROBOTIC REPAIR, HERNIA, VENTRAL (N/A)    Final Anesthesia Type: general    Patient location during evaluation: PACU  Patient participation: No - Unable to Participate, Intubation  Level of consciousness: responds to stimulation  Post-procedure vital signs: reviewed and stable  Pain management: adequate  Airway patency: patent    PONV status at discharge: No PONV  Anesthetic complications: no      Cardiovascular status: blood pressure returned to baseline  Respiratory status: intubated  Hydration status: euvolemic            Vitals Value Taken Time   /68 06/24/20 0902   Temp 37.3 °C (99.1 °F) 06/24/20 0700   Pulse 94 06/24/20 0920   Resp 20 06/24/20 0920   SpO2 93 % 06/24/20 0920   Vitals shown include unvalidated device data.      Event Time   Out of Recovery 06/23/2020 18:25:00         Pain/Janis Score: Pain Rating Prior to Med Admin: 7 (6/23/2020  5:59 AM)  Janis Score: 7 (6/23/2020  7:03 PM)

## 2020-06-25 LAB
ANION GAP SERPL CALC-SCNC: 8 MMOL/L (ref 8–16)
BASOPHILS # BLD AUTO: 0.02 K/UL (ref 0–0.2)
BASOPHILS NFR BLD: 0.3 % (ref 0–1.9)
BUN SERPL-MCNC: 9 MG/DL (ref 6–20)
CALCIUM SERPL-MCNC: 9 MG/DL (ref 8.7–10.5)
CHLORIDE SERPL-SCNC: 100 MMOL/L (ref 95–110)
CO2 SERPL-SCNC: 35 MMOL/L (ref 23–29)
CREAT SERPL-MCNC: 0.7 MG/DL (ref 0.5–1.4)
DIFFERENTIAL METHOD: ABNORMAL
EOSINOPHIL # BLD AUTO: 0.1 K/UL (ref 0–0.5)
EOSINOPHIL NFR BLD: 1 % (ref 0–8)
ERYTHROCYTE [DISTWIDTH] IN BLOOD BY AUTOMATED COUNT: 16 % (ref 11.5–14.5)
EST. GFR  (AFRICAN AMERICAN): >60 ML/MIN/1.73 M^2
EST. GFR  (NON AFRICAN AMERICAN): >60 ML/MIN/1.73 M^2
GLUCOSE SERPL-MCNC: 108 MG/DL (ref 70–110)
HCT VFR BLD AUTO: 36.8 % (ref 37–48.5)
HGB BLD-MCNC: 11.1 G/DL (ref 12–16)
IMM GRANULOCYTES # BLD AUTO: 0.05 K/UL (ref 0–0.04)
IMM GRANULOCYTES NFR BLD AUTO: 0.6 % (ref 0–0.5)
LYMPHOCYTES # BLD AUTO: 1.2 K/UL (ref 1–4.8)
LYMPHOCYTES NFR BLD: 15.3 % (ref 18–48)
MAGNESIUM SERPL-MCNC: 1.9 MG/DL (ref 1.6–2.6)
MCH RBC QN AUTO: 28 PG (ref 27–31)
MCHC RBC AUTO-ENTMCNC: 30.2 G/DL (ref 32–36)
MCV RBC AUTO: 93 FL (ref 82–98)
MONOCYTES # BLD AUTO: 0.6 K/UL (ref 0.3–1)
MONOCYTES NFR BLD: 7.4 % (ref 4–15)
NEUTROPHILS # BLD AUTO: 6 K/UL (ref 1.8–7.7)
NEUTROPHILS NFR BLD: 75.4 % (ref 38–73)
NRBC BLD-RTO: 0 /100 WBC
PHOSPHATE SERPL-MCNC: 3.3 MG/DL (ref 2.7–4.5)
PLATELET # BLD AUTO: 231 K/UL (ref 150–350)
PMV BLD AUTO: 9.9 FL (ref 9.2–12.9)
POCT GLUCOSE: 102 MG/DL (ref 70–110)
POCT GLUCOSE: 103 MG/DL (ref 70–110)
POCT GLUCOSE: 122 MG/DL (ref 70–110)
POCT GLUCOSE: 95 MG/DL (ref 70–110)
POTASSIUM SERPL-SCNC: 3.4 MMOL/L (ref 3.5–5.1)
RBC # BLD AUTO: 3.97 M/UL (ref 4–5.4)
SODIUM SERPL-SCNC: 143 MMOL/L (ref 136–145)
WBC # BLD AUTO: 7.98 K/UL (ref 3.9–12.7)

## 2020-06-25 PROCEDURE — 83735 ASSAY OF MAGNESIUM: CPT

## 2020-06-25 PROCEDURE — 36415 COLL VENOUS BLD VENIPUNCTURE: CPT

## 2020-06-25 PROCEDURE — 11000001 HC ACUTE MED/SURG PRIVATE ROOM

## 2020-06-25 PROCEDURE — 63600175 PHARM REV CODE 636 W HCPCS: Performed by: STUDENT IN AN ORGANIZED HEALTH CARE EDUCATION/TRAINING PROGRAM

## 2020-06-25 PROCEDURE — 25000003 PHARM REV CODE 250: Performed by: STUDENT IN AN ORGANIZED HEALTH CARE EDUCATION/TRAINING PROGRAM

## 2020-06-25 PROCEDURE — 85025 COMPLETE CBC W/AUTO DIFF WBC: CPT

## 2020-06-25 PROCEDURE — 84100 ASSAY OF PHOSPHORUS: CPT

## 2020-06-25 PROCEDURE — 99900035 HC TECH TIME PER 15 MIN (STAT)

## 2020-06-25 PROCEDURE — 80048 BASIC METABOLIC PNL TOTAL CA: CPT

## 2020-06-25 RX ADMIN — ACETAMINOPHEN 650 MG: 325 TABLET ORAL at 03:06

## 2020-06-25 RX ADMIN — KETOROLAC TROMETHAMINE 15 MG: 30 INJECTION, SOLUTION INTRAMUSCULAR at 12:06

## 2020-06-25 RX ADMIN — ATORVASTATIN CALCIUM 40 MG: 40 TABLET, FILM COATED ORAL at 08:06

## 2020-06-25 RX ADMIN — AMLODIPINE BESYLATE 10 MG: 5 TABLET ORAL at 08:06

## 2020-06-25 RX ADMIN — LISINOPRIL 40 MG: 20 TABLET ORAL at 08:06

## 2020-06-25 RX ADMIN — MORPHINE SULFATE 4 MG: 4 INJECTION INTRAVENOUS at 09:06

## 2020-06-25 RX ADMIN — Medication 6 MG: at 09:06

## 2020-06-25 RX ADMIN — HYDROCHLOROTHIAZIDE 25 MG: 25 TABLET ORAL at 08:06

## 2020-06-25 NOTE — PLAN OF CARE
TN saw patient at bedside. Conversation interpreted using [employer] with , Jose. Pt denies any questions or concerns for case management at present. TN ensured patient that we are here if he has any questions or concerns. TN wrote number on whiteboard and updated as needed. TN gave patient her card for contact information.  Pt reports he does have insurance at [facility] but when looking for her card she could not find it in her purse. Msg sent to Salina Mauricio to see if she could assist at present. TN called admit desk to see if they could find any information for patient for Medicaid. TN spoke with Elio.    TN informed patient that she did schedule a F/U with PCP Dr. Marie at [facility] on 7/15/2020 at 340pm. She reports she would have transportation for her follow up appointments.    Future Appointments   Date Time Provider Department Center   7/6/2020  9:00 AM Brady Barboza MD [facility]        06/25/20 1201   Discharge Reassessment   Assessment Type Discharge Planning Reassessment   Provided patient/caregiver education on the expected discharge date and the discharge plan Yes   Discharge Plan A Home;Home with family   DME Needed Upon Discharge  other (see comments)  (TBD)   Patient choice form signed by patient/caregiver N/A   Anticipated Discharge Disposition Home   Can the patient/caregiver answer the patient profile reliably? Yes, cognitively intact   How does the patient rate their overall health at the present time? Good   Describe the patient's ability to walk at the present time. No restrictions   How often would a person be available to care for the patient? Whenever needed   Number of comorbid conditions (as recorded on the chart) Five or more   Salina Sullivan RN  RN Transition Navigator  [phone]

## 2020-06-25 NOTE — PLAN OF CARE
1930H- Received patient upon rounds, patient seensitting at the edge of the bed. Polish speaking patient, speaks minimal english. Conscious , coherent to time, date, place, person and situation. GCS 15. Pleasant lady. With  subjective complaint of abdominal pain upon ambulation 5/10, moderately relieved with toradol IV. Abdominal binder in place. With abdominal incision site, clean, dry,  derma bond intact. With saline lock gauge 14 right forearm, and left forearm gauge 20, both flushed with clean and dry dressing. Oxygen saturation 97% on 4 lpm via NC.  Ambulatory with minimal assist to the bathroom.  Advised patient to call for any assistance. Call bell within reach. Bed alarm on. Safety fall precaution maintained. SCD on. Blood sugar monitored. Will continue to monitor patient.    0649H- Patient stable VS over the night, afebrile. No episodes of nausea nor vomiting. No bowel movement over the night.  Free from fall. IV line patent.Will endorse patient to day shift Nurse.

## 2020-06-25 NOTE — PROGRESS NOTES
[facility]  General Surgery  Progress Note    Subjective:     Interval History:   Some pain left adbomen, controlled with pain meds  NO NV  Brook clears  Wearing binder    Post-Op Info:  Procedure(s) (LRB):  ROBOTIC REPAIR, HERNIA, VENTRAL (N/A)   2 Days Post-Op      Medications:  Continuous Infusions:  Scheduled Meds:   amLODIPine  10 mg Oral Daily    atorvastatin  40 mg Oral Daily    hydroCHLOROthiazide  25 mg Oral Daily    lisinopriL  40 mg Oral Daily     PRN Meds:acetaminophen, dextrose 50%, glucagon (human recombinant), hydrALAZINE, insulin aspart U-100, melatonin, morphine, ondansetron, prochlorperazine, sodium chloride 0.9%     Objective:     Vital Signs (Most Recent):  Temp: 99.2 °F (37.3 °C) (06/25/20 1206)  Pulse: 73 (06/25/20 1206)  Resp: 20 (06/25/20 1206)  BP: 131/67 (06/25/20 1206)  SpO2: (!) 94 % (06/25/20 1206) Vital Signs (24h Range):  Temp:  [97.9 °F (36.6 °C)-99.4 °F (37.4 °C)] 99.2 °F (37.3 °C)  Pulse:  [67-88] 73  Resp:  [16-20] 20  SpO2:  [88 %-97 %] 94 %  BP: (115-141)/(56-82) 131/67       Intake/Output Summary (Last 24 hours) at 6/25/2020 1327  Last data filed at 6/25/2020 0700  Gross per 24 hour   Intake 200 ml   Output 500 ml   Net -300 ml       Physical Exam  Constitutional:       Appearance: Normal appearance. She is obese.   Pulmonary:      Effort: Pulmonary effort is normal.   Abdominal:      General: Abdomen is flat.      Tenderness: There is abdominal tenderness.      Comments: Appropriately TTP         Significant Labs:  CBC:   Recent Labs   Lab 06/25/20  0412   WBC 7.98   RBC 3.97*   HGB 11.1*   HCT 36.8*      MCV 93   MCH 28.0   MCHC 30.2*     CMP:   Recent Labs   Lab 06/25/20  0412      CALCIUM 9.0      K 3.4*   CO2 35*      BUN 9   CREATININE 0.7       Significant Diagnostics:  I have reviewed all pertinent imaging results/findings within the past 24 hours.    Assessment/Plan:     Active Diagnoses:    Diagnosis Date Noted POA     PRINCIPAL PROBLEM:  Ventral hernia with obstruction and without gangrene [K43.6]  Yes    History of MI (myocardial infarction) [I25.2] 06/09/2020 Not Applicable    Hyperlipidemia [E78.5] 08/18/2016 Yes    Essential hypertension [I10] 03/16/2016 Yes    Morbid obesity with BMI of 50.0-59.9, adult [E66.01, Z68.43] 03/16/2016 Not Applicable      Problems Resolved During this Admission:     54F s/p robot VHR for incarcerated obstructing hernia  Advance diet  If tolerates well and no issues overnight will plan for discharge tomorrow      Brady Barboza MD  General Surgery  [facility]

## 2020-06-25 NOTE — NURSING
"TN received a teams msg about pt's possible insurance from Salina Mauricio.     "Good Afternoon, if the pt has ins @ [facility] it may be a program they have there for their uninsured patients. We are not affiliated with [facility] therefore she would more than likely not be about to use her "[facility] insurance" here at [facility]. She was screened for Medicaid and did not meet criteria as she is not a [place] Citizen/Resident. However, Elida did reach out to her today about an EMS application, and she did not answer. "       06/25/20 1458   Post-Acute Status   Post-Acute Authorization Other   Other Status Community Services     Salina Sullivan RN  RN Transition Navigator  [phone]    "

## 2020-06-26 VITALS
DIASTOLIC BLOOD PRESSURE: 60 MMHG | BODY MASS INDEX: 55.49 KG/M2 | SYSTOLIC BLOOD PRESSURE: 132 MMHG | RESPIRATION RATE: 19 BRPM | HEIGHT: 60 IN | OXYGEN SATURATION: 94 % | TEMPERATURE: 97 F | HEART RATE: 77 BPM | WEIGHT: 282.63 LBS

## 2020-06-26 LAB
ANION GAP SERPL CALC-SCNC: 9 MMOL/L (ref 8–16)
BASOPHILS # BLD AUTO: 0.01 K/UL (ref 0–0.2)
BASOPHILS NFR BLD: 0.1 % (ref 0–1.9)
BUN SERPL-MCNC: 9 MG/DL (ref 6–20)
CALCIUM SERPL-MCNC: 9.1 MG/DL (ref 8.7–10.5)
CHLORIDE SERPL-SCNC: 99 MMOL/L (ref 95–110)
CO2 SERPL-SCNC: 35 MMOL/L (ref 23–29)
CREAT SERPL-MCNC: 0.6 MG/DL (ref 0.5–1.4)
DIFFERENTIAL METHOD: ABNORMAL
EOSINOPHIL # BLD AUTO: 0.2 K/UL (ref 0–0.5)
EOSINOPHIL NFR BLD: 2.9 % (ref 0–8)
ERYTHROCYTE [DISTWIDTH] IN BLOOD BY AUTOMATED COUNT: 15.7 % (ref 11.5–14.5)
EST. GFR  (AFRICAN AMERICAN): >60 ML/MIN/1.73 M^2
EST. GFR  (NON AFRICAN AMERICAN): >60 ML/MIN/1.73 M^2
GLUCOSE SERPL-MCNC: 102 MG/DL (ref 70–110)
HCT VFR BLD AUTO: 35.4 % (ref 37–48.5)
HGB BLD-MCNC: 11 G/DL (ref 12–16)
IMM GRANULOCYTES # BLD AUTO: 0.03 K/UL (ref 0–0.04)
IMM GRANULOCYTES NFR BLD AUTO: 0.4 % (ref 0–0.5)
LYMPHOCYTES # BLD AUTO: 1.2 K/UL (ref 1–4.8)
LYMPHOCYTES NFR BLD: 16.4 % (ref 18–48)
MAGNESIUM SERPL-MCNC: 2 MG/DL (ref 1.6–2.6)
MCH RBC QN AUTO: 28.1 PG (ref 27–31)
MCHC RBC AUTO-ENTMCNC: 31.1 G/DL (ref 32–36)
MCV RBC AUTO: 91 FL (ref 82–98)
MONOCYTES # BLD AUTO: 0.5 K/UL (ref 0.3–1)
MONOCYTES NFR BLD: 6.7 % (ref 4–15)
NEUTROPHILS # BLD AUTO: 5.3 K/UL (ref 1.8–7.7)
NEUTROPHILS NFR BLD: 73.5 % (ref 38–73)
NRBC BLD-RTO: 0 /100 WBC
PHOSPHATE SERPL-MCNC: 4.2 MG/DL (ref 2.7–4.5)
PLATELET # BLD AUTO: 255 K/UL (ref 150–350)
PMV BLD AUTO: 10.3 FL (ref 9.2–12.9)
POCT GLUCOSE: 101 MG/DL (ref 70–110)
POCT GLUCOSE: 96 MG/DL (ref 70–110)
POTASSIUM SERPL-SCNC: 3.8 MMOL/L (ref 3.5–5.1)
RBC # BLD AUTO: 3.91 M/UL (ref 4–5.4)
SODIUM SERPL-SCNC: 143 MMOL/L (ref 136–145)
WBC # BLD AUTO: 7.15 K/UL (ref 3.9–12.7)

## 2020-06-26 PROCEDURE — 80048 BASIC METABOLIC PNL TOTAL CA: CPT

## 2020-06-26 PROCEDURE — 85025 COMPLETE CBC W/AUTO DIFF WBC: CPT

## 2020-06-26 PROCEDURE — 84100 ASSAY OF PHOSPHORUS: CPT

## 2020-06-26 PROCEDURE — 94761 N-INVAS EAR/PLS OXIMETRY MLT: CPT

## 2020-06-26 PROCEDURE — 25000003 PHARM REV CODE 250: Performed by: STUDENT IN AN ORGANIZED HEALTH CARE EDUCATION/TRAINING PROGRAM

## 2020-06-26 PROCEDURE — 36415 COLL VENOUS BLD VENIPUNCTURE: CPT

## 2020-06-26 PROCEDURE — 27000221 HC OXYGEN, UP TO 24 HOURS

## 2020-06-26 PROCEDURE — 83735 ASSAY OF MAGNESIUM: CPT

## 2020-06-26 RX ORDER — HYDROCODONE BITARTRATE AND ACETAMINOPHEN 5; 325 MG/1; MG/1
1 TABLET ORAL EVERY 4 HOURS PRN
Qty: 10 TABLET | Refills: 0 | Status: SHIPPED | OUTPATIENT
Start: 2020-06-26 | End: 2020-07-06 | Stop reason: SDUPTHER

## 2020-06-26 RX ORDER — AMOXICILLIN 250 MG
1 CAPSULE ORAL 2 TIMES DAILY
COMMUNITY
Start: 2020-06-26

## 2020-06-26 RX ADMIN — ATORVASTATIN CALCIUM 40 MG: 40 TABLET, FILM COATED ORAL at 09:06

## 2020-06-26 RX ADMIN — LISINOPRIL 40 MG: 20 TABLET ORAL at 09:06

## 2020-06-26 RX ADMIN — HYDROCHLOROTHIAZIDE 25 MG: 25 TABLET ORAL at 12:06

## 2020-06-26 RX ADMIN — AMLODIPINE BESYLATE 10 MG: 5 TABLET ORAL at 09:06

## 2020-06-26 NOTE — PLAN OF CARE
Discharge orders noted. Additional clinical references attached.    Patient's discharge instructions given by bedside RN and reviewed via this VN in Bengali.  Education provided on new medication, diagnosis, and follow-up appointments. Patient verbalized understanding. All questions answered. Transport to [facility] requested. Floor nurse notified.

## 2020-06-26 NOTE — PLAN OF CARE
1905H- Received patient upon rounds, patient seensitting at the edge of the bed. Greek speaking patient, speaks minimal english. Conscious , coherent to time, date, place, person and situation. GCS 15. Pleasant lady. With  subjective complaint of abdominal pain upon ambulation 8/10, moderately relieved with morphine IV. Abdominal binder in place. With abdominal incision site, clean, dry,  derma bond intact. With saline lock gauge 14 right forearm, and left forearm gauge 20, both flushed with clean and dry dressing. Oxygen saturation 97% on 4 lpm via NC.  Ambulatory with minimal assist to the bathroom.  Advised patient to call for any assistance. Call bell within reach. Bed alarm on. Safety fall precaution maintained. SCD on. Blood sugar monitored. Will continue to monitor patient.     0649H- Patient stable VS over the night, afebrile. No episodes of nausea nor vomiting.Free from fall. IV line patent.Will endorse patient to day shift Nurse.

## 2020-06-26 NOTE — PROGRESS NOTES
[facility]  General Surgery  Progress Note    Subjective:     Interval History:   Pain improving, controlled with meds  NO NV  Brook reg diet  Wearing binder    Post-Op Info:  Procedure(s) (LRB):  ROBOTIC REPAIR, HERNIA, VENTRAL (N/A)   3 Days Post-Op      Medications:  Continuous Infusions:  Scheduled Meds:   amLODIPine  10 mg Oral Daily    atorvastatin  40 mg Oral Daily    hydroCHLOROthiazide  25 mg Oral Daily    lisinopriL  40 mg Oral Daily     PRN Meds:acetaminophen, dextrose 50%, glucagon (human recombinant), hydrALAZINE, insulin aspart U-100, melatonin, morphine, ondansetron, prochlorperazine, sodium chloride 0.9%     Objective:     Vital Signs (Most Recent):  Temp: 97 °F (36.1 °C) (06/26/20 1151)  Pulse: 77 (06/26/20 1151)  Resp: 19 (06/26/20 1151)  BP: 132/60 (06/26/20 1151)  SpO2: (!) 94 % (06/26/20 1151) Vital Signs (24h Range):  Temp:  [97 °F (36.1 °C)-98.3 °F (36.8 °C)] 97 °F (36.1 °C)  Pulse:  [70-92] 77  Resp:  [16-22] 19  SpO2:  [90 %-96 %] 94 %  BP: (109-136)/(53-62) 132/60       Intake/Output Summary (Last 24 hours) at 6/26/2020 1211  Last data filed at 6/26/2020 0600  Gross per 24 hour   Intake 300 ml   Output --   Net 300 ml       Physical Exam  Constitutional:       Appearance: Normal appearance. She is obese.   Pulmonary:      Effort: Pulmonary effort is normal.   Abdominal:      General: Abdomen is flat.      Tenderness: There is abdominal tenderness.      Comments: Appropriately TTP         Significant Labs:  CBC:   Recent Labs   Lab 06/26/20  0323   WBC 7.15   RBC 3.91*   HGB 11.0*   HCT 35.4*      MCV 91   MCH 28.1   MCHC 31.1*     CMP:   Recent Labs   Lab 06/26/20  0323      CALCIUM 9.1      K 3.8   CO2 35*   CL 99   BUN 9   CREATININE 0.6       Significant Diagnostics:  I have reviewed all pertinent imaging results/findings within the past 24 hours.    Assessment/Plan:     Active Diagnoses:    Diagnosis Date Noted POA    PRINCIPAL PROBLEM:  Ventral  hernia with obstruction and without gangrene [K43.6]  Yes    History of MI (myocardial infarction) [I25.2] 06/09/2020 Not Applicable    Hyperlipidemia [E78.5] 08/18/2016 Yes    Essential hypertension [I10] 03/16/2016 Yes    Morbid obesity with BMI of 50.0-59.9, adult [E66.01, Z68.43] 03/16/2016 Not Applicable      Problems Resolved During this Admission:     54F s/p robot VHR for incarcerated obstructing hernia  discharge home  Fu with me in two weeks      Brady Barboza MD  General Surgery  [facility]

## 2020-06-26 NOTE — PLAN OF CARE
Discharge rounds on patient via Fay. Discussed followup appointments, blue discharge folder, discharge nurse will go over home medications and reasons for medications and final discharge instructions. All patient/caregiver questions answered. Patient verbalized understanding.        [facility]  Go on 7/15/2020  at 340pm; FOLLOW UP WITH PCP;  numbers  ; [phone],  [address] [place] [address]   Brady Barboza MD  Go on 7/6/2020  9:00 am , FOLLOW UP WITH GENERAL SURGERY AFTER DISCHARGE  [address] [facility]  [address] [place] [id number]  [phone]          06/26/20 1255   Final Note   Assessment Type Final Discharge Note   Anticipated Discharge Disposition Home   What phone number can be called within the next 1-3 days to see how you are doing after discharge? [phone]   Hospital Follow Up  Appt(s) scheduled? Yes   Discharge plans and expectations educations in teach back method with documentation complete? Yes   Right Care Referral Info   Post Acute Recommendation No Care     Salina Sullivan RN  RN Transition Navigator  [phone]

## 2020-07-06 ENCOUNTER — OFFICE VISIT (OUTPATIENT)
Dept: SURGERY | Facility: CLINIC | Age: 55
End: 2020-07-06

## 2020-07-06 ENCOUNTER — NURSE TRIAGE (OUTPATIENT)
Dept: ADMINISTRATIVE | Facility: CLINIC | Age: 55
End: 2020-07-06

## 2020-07-06 VITALS
WEIGHT: 199.31 LBS | HEIGHT: 60 IN | TEMPERATURE: 98 F | BODY MASS INDEX: 39.13 KG/M2 | HEART RATE: 78 BPM | SYSTOLIC BLOOD PRESSURE: 150 MMHG | DIASTOLIC BLOOD PRESSURE: 81 MMHG

## 2020-07-06 DIAGNOSIS — K43.6 VENTRAL HERNIA WITH OBSTRUCTION AND WITHOUT GANGRENE: Primary | ICD-10-CM

## 2020-07-06 PROCEDURE — 99024 POSTOP FOLLOW-UP VISIT: CPT | Mod: ,,, | Performed by: STUDENT IN AN ORGANIZED HEALTH CARE EDUCATION/TRAINING PROGRAM

## 2020-07-06 PROCEDURE — 99999 PR PBB SHADOW E&M-EST. PATIENT-LVL III: ICD-10-PCS | Mod: PBBFAC,,, | Performed by: STUDENT IN AN ORGANIZED HEALTH CARE EDUCATION/TRAINING PROGRAM

## 2020-07-06 PROCEDURE — 99999 PR PBB SHADOW E&M-EST. PATIENT-LVL III: CPT | Mod: PBBFAC,,, | Performed by: STUDENT IN AN ORGANIZED HEALTH CARE EDUCATION/TRAINING PROGRAM

## 2020-07-06 PROCEDURE — 99024 PR POST-OP FOLLOW-UP VISIT: ICD-10-PCS | Mod: ,,, | Performed by: STUDENT IN AN ORGANIZED HEALTH CARE EDUCATION/TRAINING PROGRAM

## 2020-07-06 PROCEDURE — 99213 OFFICE O/P EST LOW 20 MIN: CPT | Mod: PBBFAC,PO | Performed by: STUDENT IN AN ORGANIZED HEALTH CARE EDUCATION/TRAINING PROGRAM

## 2020-07-06 RX ORDER — HYDROCODONE BITARTRATE AND ACETAMINOPHEN 5; 325 MG/1; MG/1
1 TABLET ORAL EVERY 4 HOURS PRN
Qty: 10 TABLET | Refills: 0 | Status: SHIPPED | OUTPATIENT
Start: 2020-07-06 | End: 2020-07-14 | Stop reason: SDUPTHER

## 2020-07-06 NOTE — DISCHARGE SUMMARY
[facility]  Short Stay  Discharge Summary    Admit Date: 6/21/2020    Discharge Date and Time: 6/26/2020  3:02 PM      Discharge Attending Physician: No att. providers found     Hospital Course (synopsis of major diagnoses, care, treatment, and services provided during the course of the hospital stay): 54F with known ventral hernia represented to ED with obstructive symptoms. Cardology was consulted. She was taken for robot VHR which she tolerated well. POD1 her pain was moderately controlled and her diet was advanced. The next day she was ambulating better, pain was better and she was discharged home.      Final Diagnoses:    Principal Problem: Ventral hernia with obstruction and without gangrene   Secondary Diagnoses:   Active Hospital Problems    Diagnosis  POA    *Ventral hernia with obstruction and without gangrene [K43.6]  Yes    History of MI (myocardial infarction) [I25.2]  Not Applicable    Hyperlipidemia [E78.5]  Yes    Essential hypertension [I10]  Yes    Morbid obesity with BMI of 50.0-59.9, adult [E66.01, Z68.43]  Not Applicable      Resolved Hospital Problems   No resolved problems to display.       Discharged Condition: stable    Disposition: Home or Self Care    Follow up/Patient Instructions:    Medications:  Reconciled Home Medications:      Medication List      START taking these medications    senna-docusate 8.6-50 mg 8.6-50 mg per tablet  Commonly known as: PERICOLACE  Take 1 tablet by mouth 2 (two) times daily.        CONTINUE taking these medications    amLODIPine 10 MG tablet  Commonly known as: NORVASC  Take 10 mg by mouth once daily.     atorvastatin 40 MG tablet  Commonly known as: LIPITOR  Take 1 tablet (40 mg total) by mouth once daily.     hydroCHLOROthiazide 25 MG tablet  Commonly known as: HYDRODIURIL  Take 1 tablet (25 mg total) by mouth once daily.     lisinopriL 40 MG tablet  Commonly known as: PRINIVIL,ZESTRIL  Take 1 tablet (40 mg total) by mouth once  daily.     metFORMIN 500 MG tablet  Commonly known as: GLUCOPHAGE  Take 1 tablet (500 mg total) by mouth daily with breakfast.     nitroGLYCERIN 0.4 MG SL tablet  Commonly known as: NITROSTAT  Place 1 tablet (0.4 mg total) under the tongue every 5 (five) minutes as needed for Chest pain (Take 1 after 15 minutes of chest pain, if chest pain persists, take a second pill and head to local Emergency Room).     sodium chloride 0.65 % nasal spray  Commonly known as: OCEAN  1 spray by Nasal route as needed for Congestion.          Discharge Procedure Orders   Diet general     Call MD for:  temperature >100.4     Call MD for:  persistent nausea and vomiting     Call MD for:  severe uncontrolled pain     No dressing needed     Wound care routine (specify)   Order Comments: Wear abdominal binder when out of bed     Follow-up Information     [facility]. Go on 7/15/2020.    Why:  at 340pm; FOLLOW UP WITH PCP;  numbers  [phone] ; [phone],  Contact information:  [address] [place] [address]           Brady Barboza MD. Go on 7/6/2020.    Specialties: Surgery, General Surgery  Why: 9:00 am   , FOLLOW UP WITH GENERAL SURGERY AFTER DISCHARGE  Contact information:  [address]  [address] [place] [id number] [phone]

## 2020-07-06 NOTE — PROGRESS NOTES
S/p robot vhr  Doing well  Pain improving  Still takes one pain pill at night but overall feeling well  No NV  Reg BMs  No bulge  Denies CP, SOB  Ambulating well  Incisions healing well, no DC  Ab soft, nontender, no hernia palpable  RTC in a month to see how shes doing  Refill pain pills

## 2020-07-06 NOTE — TELEPHONE ENCOUNTER
Post Procedural Symptom Tracker. Pt had in office visit on which was days post-op from their procedure on . Per symptom tracker protocol, no contact made. No follow up needed.    Reason for Disposition   Caller has already spoken with the PCP and has no further questions.    Protocols used: NO CONTACT OR DUPLICATE CONTACT CALL-A-AH

## 2020-07-13 ENCOUNTER — TELEPHONE (OUTPATIENT)
Dept: SURGERY | Facility: CLINIC | Age: 55
End: 2020-07-13

## 2020-07-14 ENCOUNTER — OFFICE VISIT (OUTPATIENT)
Dept: SURGERY | Facility: CLINIC | Age: 55
End: 2020-07-14

## 2020-07-14 VITALS
HEART RATE: 72 BPM | DIASTOLIC BLOOD PRESSURE: 92 MMHG | BODY MASS INDEX: 55.49 KG/M2 | WEIGHT: 282.63 LBS | HEIGHT: 60 IN | SYSTOLIC BLOOD PRESSURE: 163 MMHG

## 2020-07-14 DIAGNOSIS — K43.6 VENTRAL HERNIA WITH OBSTRUCTION AND WITHOUT GANGRENE: Primary | ICD-10-CM

## 2020-07-14 PROCEDURE — 99999 PR PBB SHADOW E&M-EST. PATIENT-LVL III: CPT | Mod: PBBFAC,,, | Performed by: STUDENT IN AN ORGANIZED HEALTH CARE EDUCATION/TRAINING PROGRAM

## 2020-07-14 PROCEDURE — 99024 POSTOP FOLLOW-UP VISIT: CPT | Mod: ,,, | Performed by: STUDENT IN AN ORGANIZED HEALTH CARE EDUCATION/TRAINING PROGRAM

## 2020-07-14 PROCEDURE — 99213 OFFICE O/P EST LOW 20 MIN: CPT | Mod: PBBFAC,PO | Performed by: STUDENT IN AN ORGANIZED HEALTH CARE EDUCATION/TRAINING PROGRAM

## 2020-07-14 PROCEDURE — 99024 PR POST-OP FOLLOW-UP VISIT: ICD-10-PCS | Mod: ,,, | Performed by: STUDENT IN AN ORGANIZED HEALTH CARE EDUCATION/TRAINING PROGRAM

## 2020-07-14 PROCEDURE — 99999 PR PBB SHADOW E&M-EST. PATIENT-LVL III: ICD-10-PCS | Mod: PBBFAC,,, | Performed by: STUDENT IN AN ORGANIZED HEALTH CARE EDUCATION/TRAINING PROGRAM

## 2020-07-14 RX ORDER — HYDROCODONE BITARTRATE AND ACETAMINOPHEN 5; 325 MG/1; MG/1
1 TABLET ORAL EVERY 4 HOURS PRN
Qty: 10 TABLET | Refills: 0 | Status: SHIPPED | OUTPATIENT
Start: 2020-07-14

## 2020-07-14 NOTE — PROGRESS NOTES
Complains of new RUQ pain just medial to right upper incision  No drainage  Has not been wearing binder recently  Thinks there is a new lump in that area\  Exam somewhat limited due to habitus, no obvious hernia  Will wait a few more weeks.  Could have popped suture?  May check CT in a few weeks if not resolving, rule out needle, recurrence  Refill pain meds  RT aug3

## 2020-08-03 ENCOUNTER — OFFICE VISIT (OUTPATIENT)
Dept: SURGERY | Facility: CLINIC | Age: 55
End: 2020-08-03

## 2020-08-03 VITALS
SYSTOLIC BLOOD PRESSURE: 148 MMHG | HEART RATE: 91 BPM | WEIGHT: 284.31 LBS | HEIGHT: 60 IN | BODY MASS INDEX: 55.82 KG/M2 | DIASTOLIC BLOOD PRESSURE: 90 MMHG

## 2020-08-03 DIAGNOSIS — K43.6 VENTRAL HERNIA WITH OBSTRUCTION AND WITHOUT GANGRENE: Primary | ICD-10-CM

## 2020-08-03 PROCEDURE — 99213 OFFICE O/P EST LOW 20 MIN: CPT | Mod: PBBFAC,PO | Performed by: STUDENT IN AN ORGANIZED HEALTH CARE EDUCATION/TRAINING PROGRAM

## 2020-08-03 PROCEDURE — 99024 PR POST-OP FOLLOW-UP VISIT: ICD-10-PCS | Mod: ,,, | Performed by: STUDENT IN AN ORGANIZED HEALTH CARE EDUCATION/TRAINING PROGRAM

## 2020-08-03 PROCEDURE — 99999 PR PBB SHADOW E&M-EST. PATIENT-LVL III: CPT | Mod: PBBFAC,,, | Performed by: STUDENT IN AN ORGANIZED HEALTH CARE EDUCATION/TRAINING PROGRAM

## 2020-08-03 PROCEDURE — 99999 PR PBB SHADOW E&M-EST. PATIENT-LVL III: ICD-10-PCS | Mod: PBBFAC,,, | Performed by: STUDENT IN AN ORGANIZED HEALTH CARE EDUCATION/TRAINING PROGRAM

## 2020-08-03 PROCEDURE — 99024 POSTOP FOLLOW-UP VISIT: CPT | Mod: ,,, | Performed by: STUDENT IN AN ORGANIZED HEALTH CARE EDUCATION/TRAINING PROGRAM

## 2020-08-03 NOTE — PROGRESS NOTES
Feeling well now, no pain. Not taking pain pills  Previous pain, swelling has resolved  Ambulating well  No drainage  Area of palpated swelling has resolved, no tenderness, no hernia felt  Eating well  No fevers  Resume normal activity  RTC prn

## 2021-07-23 NOTE — HPI
Reina Edwards is a 54 y.o. female w hx of morbid obesity (BMI 56), HTN, HLD, recent hx of MI (March 2020, prescribed but not currently taking ASA or plavix), and an incarcerated ventral hernia that has been present for about one year. She was previously admitted at the beginning of May and non-operatively treated for a small bowel obstruction at the site of the ventral hernia. She was discharged with the plan to follow up with Cardiology as an outpatient and be further optimized prior to ventral hernia repair. Patient re-presented to the [facility] ER on 6/21 with a one day history of nausea, vomiting, and abdominal pain. Labwork and vital signs were within normal limits, but CT scan demonstrated a partial SBO at the site of the ventral hernia with fecalization of the small bowel. No signs of perforation or bowel ischemia. She firmly declined placement of NG tube on arrival.  She was admitted to General Surgery for management of her SBO.    RN, P/T, OT, and HHA evaluation

## 2024-07-22 DIAGNOSIS — Z12.31 ENCOUNTER FOR SCREENING MAMMOGRAM FOR MALIGNANT NEOPLASM OF BREAST: Primary | ICD-10-CM

## (undated) DEVICE — MANIFOLD 4 PORT

## (undated) DEVICE — CORD BIPOLAR ENERGY INST XI

## (undated) DEVICE — COVER TIP CURVED SCISSORS XI

## (undated) DEVICE — ADHESIVE DERMABOND ADVANCED

## (undated) DEVICE — CORD MONOPOLAR ENERGY INST XI

## (undated) DEVICE — SEE MEDLINE ITEM 156952

## (undated) DEVICE — SUT MCRYL PLUS 4-0 PS2 27IN

## (undated) DEVICE — SCISSOR HOT SHEARS XI

## (undated) DEVICE — DRAPE ARM DAVINCI XI

## (undated) DEVICE — ELECTRODE REM PLYHSV RETURN 9

## (undated) DEVICE — SUT V-LOC 90 UD GS22 2-0 9IN

## (undated) DEVICE — Device

## (undated) DEVICE — KIT WING PAD POSITIONING

## (undated) DEVICE — OBTURATOR BLADELESS 8MM XI CLR

## (undated) DEVICE — NDL DRIVE LARGE XI

## (undated) DEVICE — CANNULA REDUCER 12-8MM

## (undated) DEVICE — DRESSING AQUACEL SACRAL 9 X 9

## (undated) DEVICE — NDL 22GA X1 1/2 REG BEVEL

## (undated) DEVICE — GLOVE SURGICAL LATEX SZ 7

## (undated) DEVICE — SUT STRATAFIX 0 PDS+ 23CM 9IN

## (undated) DEVICE — SUT VICRYL 3-0 27 SH

## (undated) DEVICE — SEE MEDLINE ITEM 152741

## (undated) DEVICE — SEAL UNIVERSAL 5MM-8MM XI